# Patient Record
Sex: FEMALE | Race: WHITE | NOT HISPANIC OR LATINO | Employment: UNEMPLOYED | URBAN - METROPOLITAN AREA
[De-identification: names, ages, dates, MRNs, and addresses within clinical notes are randomized per-mention and may not be internally consistent; named-entity substitution may affect disease eponyms.]

---

## 2017-01-28 ENCOUNTER — APPOINTMENT (OUTPATIENT)
Dept: CT IMAGING | Age: 37
End: 2017-01-28
Attending: EMERGENCY MEDICINE

## 2017-01-28 ENCOUNTER — HOSPITAL ENCOUNTER (EMERGENCY)
Age: 37
Discharge: HOME | End: 2017-01-28
Attending: EMERGENCY MEDICINE

## 2017-01-28 VITALS
BODY MASS INDEX: 45.99 KG/M2 | RESPIRATION RATE: 18 BRPM | TEMPERATURE: 98.7 F | WEIGHT: 293 LBS | OXYGEN SATURATION: 100 % | DIASTOLIC BLOOD PRESSURE: 100 MMHG | SYSTOLIC BLOOD PRESSURE: 151 MMHG | HEART RATE: 67 BPM | HEIGHT: 67 IN

## 2017-01-28 VITALS
HEART RATE: 96 BPM | SYSTOLIC BLOOD PRESSURE: 116 MMHG | HEIGHT: 67 IN | RESPIRATION RATE: 18 BRPM | BODY MASS INDEX: 45.99 KG/M2 | WEIGHT: 293 LBS | TEMPERATURE: 97.4 F | DIASTOLIC BLOOD PRESSURE: 75 MMHG | OXYGEN SATURATION: 96 %

## 2017-01-28 DIAGNOSIS — N20.0 BILATERAL KIDNEY STONES: ICD-10-CM

## 2017-01-28 DIAGNOSIS — R10.9 LEFT FLANK PAIN: Primary | ICD-10-CM

## 2017-01-28 DIAGNOSIS — R55 SYNCOPE, UNSPECIFIED SYNCOPE TYPE: ICD-10-CM

## 2017-01-28 DIAGNOSIS — S09.90XA CLOSED HEAD INJURY, INITIAL ENCOUNTER: Primary | ICD-10-CM

## 2017-01-28 LAB
ANION GAP SERPL CALC-SCNC: 16 MMOL/L (ref 10–20)
APPEARANCE UR: ABNORMAL
B-HCG UR QL: NEGATIVE
BACTERIA #/AREA URNS HPF: ABNORMAL /HPF
BASOPHILS # BLD AUTO: 0.1 K/MCL (ref 0–0.3)
BASOPHILS NFR BLD AUTO: 0 %
BILIRUB UR QL STRIP: NEGATIVE
BUN SERPL-MCNC: 5 MG/DL (ref 10–20)
BUN/CREAT SERPL: 11 (ref 7–25)
CALCIUM SERPL-MCNC: 9.3 MG/DL (ref 8.4–10.2)
CHLORIDE SERPL-SCNC: 104 MMOL/L (ref 98–107)
CO2 SERPL-SCNC: 23 MMOL/L (ref 21–32)
COLOR UR: ABNORMAL
CREAT SERPL-MCNC: 0.47 MG/DL (ref 0.51–0.95)
CROSSMATCH EXPIRE: NORMAL
DIFFERENTIAL METHOD BLD: ABNORMAL
EOSINOPHIL # BLD AUTO: 0.2 K/MCL (ref 0.1–0.5)
EOSINOPHIL NFR SPEC: 1 %
ERYTHROCYTE [DISTWIDTH] IN BLOOD: 13.9 % (ref 11–15)
GLUCOSE SERPL-MCNC: 125 MG/DL (ref 65–99)
GLUCOSE UR STRIP-MCNC: NEGATIVE MG/DL
HCT VFR BLD CALC: 45.8 % (ref 36–46.5)
HGB BLD-MCNC: 15.6 G/DL (ref 12–15.5)
HGB UR QL STRIP: NEGATIVE
HYALINE CASTS #/AREA URNS LPF: ABNORMAL /LPF (ref 0–5)
KETONES UR STRIP-MCNC: NEGATIVE MG/DL
LEUKOCYTE ESTERASE UR QL STRIP: NEGATIVE
LYMPHOCYTES # BLD MANUAL: 3.1 K/MCL (ref 1–4.8)
LYMPHOCYTES NFR BLD MANUAL: 19 %
MCH RBC QN AUTO: 30.9 PG (ref 26–34)
MCHC RBC AUTO-ENTMCNC: 34.1 G/DL (ref 32–36.5)
MCV RBC AUTO: 90.7 FL (ref 78–100)
MONOCYTES # BLD MANUAL: 0.6 K/MCL (ref 0.3–0.9)
MONOCYTES NFR BLD MANUAL: 4 %
MUCOUS THREADS URNS QL MICRO: PRESENT
NEUTROPHILS # BLD AUTO: 12.9 K/MCL (ref 1.8–7.7)
NEUTROPHILS NFR BLD AUTO: 76 %
NITRITE UR QL STRIP: NEGATIVE
PH UR STRIP: 5.5 UNITS (ref 5–7)
PLATELET # BLD: 211 K/MCL (ref 140–450)
POTASSIUM SERPL-SCNC: 2.9 MMOL/L (ref 3.4–5.1)
PROT UR STRIP-MCNC: NEGATIVE MG/DL
RBC # BLD: 5.05 MIL/MCL (ref 4–5.2)
RBC #/AREA URNS HPF: ABNORMAL /HPF (ref 0–3)
SODIUM SERPL-SCNC: 140 MMOL/L (ref 135–145)
SP GR UR STRIP: 1.02 (ref 1–1.03)
SPECIMEN SOURCE: ABNORMAL
SQUAMOUS #/AREA URNS HPF: ABNORMAL /HPF (ref 0–5)
UROBILINOGEN UR STRIP-MCNC: 1 MG/DL (ref 0–1)
WBC # BLD: 16.8 K/MCL (ref 4.2–11)
WBC #/AREA URNS HPF: ABNORMAL /HPF (ref 0–5)

## 2017-01-28 PROCEDURE — 80048 BASIC METABOLIC PNL TOTAL CA: CPT

## 2017-01-28 PROCEDURE — 70450 CT HEAD/BRAIN W/O DYE: CPT | Performed by: RADIOLOGY

## 2017-01-28 PROCEDURE — 70450 CT HEAD/BRAIN W/O DYE: CPT

## 2017-01-28 PROCEDURE — 99284 EMERGENCY DEPT VISIT MOD MDM: CPT

## 2017-01-28 PROCEDURE — 93005 ELECTROCARDIOGRAM TRACING: CPT | Performed by: EMERGENCY MEDICINE

## 2017-01-28 PROCEDURE — 36415 COLL VENOUS BLD VENIPUNCTURE: CPT

## 2017-01-28 PROCEDURE — 85025 COMPLETE CBC W/AUTO DIFF WBC: CPT

## 2017-01-28 PROCEDURE — 74176 CT ABD & PELVIS W/O CONTRAST: CPT | Performed by: RADIOLOGY

## 2017-01-28 RX ORDER — HYDROMORPHONE HYDROCHLORIDE 1 MG/ML
0.5 INJECTION, SOLUTION INTRAMUSCULAR; INTRAVENOUS; SUBCUTANEOUS
Status: DISCONTINUED | OUTPATIENT
Start: 2017-01-28 | End: 2017-01-28 | Stop reason: HOSPADM

## 2017-01-28 RX ORDER — SODIUM CHLORIDE 9 MG/ML
INJECTION, SOLUTION INTRAVENOUS CONTINUOUS
Status: DISCONTINUED | OUTPATIENT
Start: 2017-01-28 | End: 2017-01-28 | Stop reason: HOSPADM

## 2017-01-28 RX ORDER — ONDANSETRON 2 MG/ML
4 INJECTION INTRAMUSCULAR; INTRAVENOUS
Status: COMPLETED | OUTPATIENT
Start: 2017-01-28 | End: 2017-01-28

## 2017-01-28 RX ORDER — TAMSULOSIN HYDROCHLORIDE 0.4 MG/1
0.4 CAPSULE ORAL DAILY
Qty: 30 CAPSULE | Refills: 0 | Status: SHIPPED | OUTPATIENT
Start: 2017-01-28

## 2017-01-28 RX ORDER — HALOPERIDOL 5 MG/ML
2.5 INJECTION INTRAMUSCULAR ONCE
Status: COMPLETED | OUTPATIENT
Start: 2017-01-28 | End: 2017-01-28

## 2017-01-28 RX ORDER — ONDANSETRON 8 MG/1
8 TABLET, ORALLY DISINTEGRATING ORAL EVERY 8 HOURS PRN
Qty: 10 TABLET | Refills: 0 | Status: SHIPPED | OUTPATIENT
Start: 2017-01-28

## 2017-01-28 RX ORDER — HYDROCODONE BITARTRATE AND ACETAMINOPHEN 5; 325 MG/1; MG/1
1-2 TABLET ORAL EVERY 6 HOURS PRN
Qty: 20 TABLET | Refills: 0 | Status: SHIPPED | OUTPATIENT
Start: 2017-01-28

## 2017-01-28 RX ADMIN — HYDROMORPHONE HYDROCHLORIDE 0.5 MG: 1 INJECTION, SOLUTION INTRAMUSCULAR; INTRAVENOUS; SUBCUTANEOUS at 06:21

## 2017-01-28 RX ADMIN — ONDANSETRON 4 MG: 2 SOLUTION INTRAMUSCULAR; INTRAVENOUS at 04:59

## 2017-01-28 RX ADMIN — HALOPERIDOL LACTATE 2.5 MG: 5 INJECTION, SOLUTION INTRAMUSCULAR at 06:20

## 2017-01-28 RX ADMIN — MORPHINE SULFATE 4 MG: 4 INJECTION, SOLUTION INTRAMUSCULAR; INTRAVENOUS at 04:58

## 2017-01-28 RX ADMIN — SODIUM CHLORIDE 1000 ML: 9 INJECTION, SOLUTION INTRAVENOUS at 04:54

## 2017-01-28 ASSESSMENT — ENCOUNTER SYMPTOMS
SORE THROAT: 0
FEVER: 0
COUGH: 0
WEAKNESS: 0
NAUSEA: 0
RHINORRHEA: 0
VOMITING: 0
BRUISES/BLEEDS EASILY: 0
DIARRHEA: 0
SHORTNESS OF BREATH: 0
NAUSEA: 1
HEADACHES: 1
SORE THROAT: 0
SHORTNESS OF BREATH: 1
DIZZINESS: 0
ADENOPATHY: 0
DIZZINESS: 0
AGITATION: 0
ABDOMINAL PAIN: 0
CHILLS: 0
BACK PAIN: 1
PSYCHIATRIC NEGATIVE: 1
FEVER: 0
HEADACHES: 0
VOMITING: 0
COUGH: 0
DIARRHEA: 0
NUMBNESS: 0

## 2017-01-28 ASSESSMENT — PAIN SCALES - GENERAL
PAINLEVEL_OUTOF10: 6
PAIN_LEVEL_AT_REST: 8
PAIN_LEVEL_AT_REST: 8
PAINLEVEL_OUTOF10: 5
PAINLEVEL_OUTOF10: 4
PAINLEVEL_OUTOF10: 8

## 2017-01-29 LAB
ATRIAL RATE (BPM): 99
DIASTOLIC BLOOD PRESSURE: 114
P AXIS (DEGREES): 49
PR-INTERVAL (MSEC): 152
QRS-INTERVAL (MSEC): 92
QT-INTERVAL (MSEC): 400
QTC: 513
R AXIS (DEGREES): 62
REPORT TEXT: NORMAL
SYSTOLIC BLOOD PRESSURE: 175
T AXIS (DEGREES): 0
VENTRICULAR RATE EKG/MIN (BPM): 99

## 2018-07-17 ENCOUNTER — APPOINTMENT (OUTPATIENT)
Dept: CT IMAGING | Facility: CLINIC | Age: 38
End: 2018-07-17
Attending: EMERGENCY MEDICINE
Payer: MEDICARE

## 2018-07-17 ENCOUNTER — APPOINTMENT (OUTPATIENT)
Dept: MRI IMAGING | Facility: CLINIC | Age: 38
End: 2018-07-17
Attending: EMERGENCY MEDICINE
Payer: MEDICARE

## 2018-07-17 ENCOUNTER — APPOINTMENT (OUTPATIENT)
Dept: GENERAL RADIOLOGY | Facility: CLINIC | Age: 38
End: 2018-07-17
Attending: EMERGENCY MEDICINE
Payer: MEDICARE

## 2018-07-17 ENCOUNTER — HOSPITAL ENCOUNTER (EMERGENCY)
Facility: CLINIC | Age: 38
Discharge: HOME | End: 2018-07-17
Attending: EMERGENCY MEDICINE | Admitting: EMERGENCY MEDICINE
Payer: MEDICARE

## 2018-07-17 VITALS
TEMPERATURE: 98.8 F | WEIGHT: 260.14 LBS | OXYGEN SATURATION: 100 % | SYSTOLIC BLOOD PRESSURE: 158 MMHG | RESPIRATION RATE: 16 BRPM | HEIGHT: 67 IN | BODY MASS INDEX: 40.83 KG/M2 | HEART RATE: 62 BPM | DIASTOLIC BLOOD PRESSURE: 100 MMHG

## 2018-07-17 DIAGNOSIS — R07.9 CHEST PAIN, UNSPECIFIED TYPE: ICD-10-CM

## 2018-07-17 DIAGNOSIS — J18.9 PULMONARY INFECTION: ICD-10-CM

## 2018-07-17 DIAGNOSIS — F17.210 CIGARETTE SMOKER: ICD-10-CM

## 2018-07-17 LAB
ALBUMIN SERPL-MCNC: 3.8 G/DL (ref 3.4–5)
ALP SERPL-CCNC: 92 U/L (ref 40–150)
ALT SERPL W P-5'-P-CCNC: 21 U/L (ref 0–50)
ANION GAP SERPL CALCULATED.3IONS-SCNC: 7 MMOL/L (ref 3–14)
AST SERPL W P-5'-P-CCNC: 8 U/L (ref 0–45)
BASOPHILS # BLD AUTO: 0.1 10E9/L (ref 0–0.2)
BASOPHILS NFR BLD AUTO: 0.5 %
BILIRUB SERPL-MCNC: 0.5 MG/DL (ref 0.2–1.3)
BUN SERPL-MCNC: 8 MG/DL (ref 7–30)
CALCIUM SERPL-MCNC: 8.8 MG/DL (ref 8.5–10.1)
CHLORIDE SERPL-SCNC: 105 MMOL/L (ref 94–109)
CO2 SERPL-SCNC: 28 MMOL/L (ref 20–32)
CREAT SERPL-MCNC: 0.57 MG/DL (ref 0.52–1.04)
D DIMER PPP FEU-MCNC: 0.4 UG/ML FEU (ref 0–0.5)
DIFFERENTIAL METHOD BLD: ABNORMAL
EOSINOPHIL # BLD AUTO: 0.4 10E9/L (ref 0–0.7)
EOSINOPHIL NFR BLD AUTO: 3.2 %
ERYTHROCYTE [DISTWIDTH] IN BLOOD BY AUTOMATED COUNT: 12.7 % (ref 10–15)
GFR SERPL CREATININE-BSD FRML MDRD: >90 ML/MIN/1.7M2
GLUCOSE SERPL-MCNC: 88 MG/DL (ref 70–99)
HCG SERPL QL: NEGATIVE
HCT VFR BLD AUTO: 45.6 % (ref 35–47)
HGB BLD-MCNC: 15.4 G/DL (ref 11.7–15.7)
IMM GRANULOCYTES # BLD: 0 10E9/L (ref 0–0.4)
IMM GRANULOCYTES NFR BLD: 0.3 %
INR PPP: 0.94 (ref 0.86–1.14)
INTERPRETATION ECG - MUSE: NORMAL
LYMPHOCYTES # BLD AUTO: 2.5 10E9/L (ref 0.8–5.3)
LYMPHOCYTES NFR BLD AUTO: 21.9 %
MCH RBC QN AUTO: 30.6 PG (ref 26.5–33)
MCHC RBC AUTO-ENTMCNC: 33.8 G/DL (ref 31.5–36.5)
MCV RBC AUTO: 91 FL (ref 78–100)
MONOCYTES # BLD AUTO: 0.6 10E9/L (ref 0–1.3)
MONOCYTES NFR BLD AUTO: 5.6 %
NEUTROPHILS # BLD AUTO: 7.8 10E9/L (ref 1.6–8.3)
NEUTROPHILS NFR BLD AUTO: 68.5 %
NRBC # BLD AUTO: 0 10*3/UL
NRBC BLD AUTO-RTO: 0 /100
NT-PROBNP SERPL-MCNC: 17 PG/ML (ref 0–450)
PLATELET # BLD AUTO: 195 10E9/L (ref 150–450)
POTASSIUM SERPL-SCNC: 3.6 MMOL/L (ref 3.4–5.3)
PROT SERPL-MCNC: 7.6 G/DL (ref 6.8–8.8)
RBC # BLD AUTO: 5.03 10E12/L (ref 3.8–5.2)
SODIUM SERPL-SCNC: 139 MMOL/L (ref 133–144)
TROPONIN I SERPL-MCNC: <0.015 UG/L (ref 0–0.04)
TROPONIN I SERPL-MCNC: <0.015 UG/L (ref 0–0.04)
WBC # BLD AUTO: 11.4 10E9/L (ref 4–11)

## 2018-07-17 PROCEDURE — 93005 ELECTROCARDIOGRAM TRACING: CPT | Mod: 76

## 2018-07-17 PROCEDURE — 93010 ELECTROCARDIOGRAM REPORT: CPT | Mod: Z6 | Performed by: EMERGENCY MEDICINE

## 2018-07-17 PROCEDURE — 84703 CHORIONIC GONADOTROPIN ASSAY: CPT | Performed by: EMERGENCY MEDICINE

## 2018-07-17 PROCEDURE — 70549 MR ANGIOGRAPH NECK W/O&W/DYE: CPT

## 2018-07-17 PROCEDURE — 25000128 H RX IP 250 OP 636: Performed by: RADIOLOGY

## 2018-07-17 PROCEDURE — 99285 EMERGENCY DEPT VISIT HI MDM: CPT | Mod: 25 | Performed by: EMERGENCY MEDICINE

## 2018-07-17 PROCEDURE — 80053 COMPREHEN METABOLIC PANEL: CPT | Performed by: EMERGENCY MEDICINE

## 2018-07-17 PROCEDURE — 85025 COMPLETE CBC W/AUTO DIFF WBC: CPT | Performed by: EMERGENCY MEDICINE

## 2018-07-17 PROCEDURE — 85379 FIBRIN DEGRADATION QUANT: CPT | Performed by: EMERGENCY MEDICINE

## 2018-07-17 PROCEDURE — 71260 CT THORAX DX C+: CPT

## 2018-07-17 PROCEDURE — A9270 NON-COVERED ITEM OR SERVICE: HCPCS | Mod: GY | Performed by: EMERGENCY MEDICINE

## 2018-07-17 PROCEDURE — 83880 ASSAY OF NATRIURETIC PEPTIDE: CPT | Performed by: EMERGENCY MEDICINE

## 2018-07-17 PROCEDURE — 84484 ASSAY OF TROPONIN QUANT: CPT | Performed by: EMERGENCY MEDICINE

## 2018-07-17 PROCEDURE — 99285 EMERGENCY DEPT VISIT HI MDM: CPT | Mod: 25

## 2018-07-17 PROCEDURE — 85610 PROTHROMBIN TIME: CPT | Performed by: EMERGENCY MEDICINE

## 2018-07-17 PROCEDURE — A9585 GADOBUTROL INJECTION: HCPCS | Performed by: EMERGENCY MEDICINE

## 2018-07-17 PROCEDURE — 71046 X-RAY EXAM CHEST 2 VIEWS: CPT

## 2018-07-17 PROCEDURE — 25000128 H RX IP 250 OP 636: Performed by: EMERGENCY MEDICINE

## 2018-07-17 PROCEDURE — 93005 ELECTROCARDIOGRAM TRACING: CPT

## 2018-07-17 PROCEDURE — 25000132 ZZH RX MED GY IP 250 OP 250 PS 637: Performed by: EMERGENCY MEDICINE

## 2018-07-17 RX ORDER — DOXYCYCLINE 100 MG/1
100 CAPSULE ORAL 2 TIMES DAILY
Qty: 20 CAPSULE | Refills: 0 | Status: SHIPPED | OUTPATIENT
Start: 2018-07-17 | End: 2018-07-27

## 2018-07-17 RX ORDER — ACETAMINOPHEN 500 MG
1000 TABLET ORAL ONCE
Status: COMPLETED | OUTPATIENT
Start: 2018-07-17 | End: 2018-07-17

## 2018-07-17 RX ORDER — IOPAMIDOL 755 MG/ML
74 INJECTION, SOLUTION INTRAVASCULAR ONCE
Status: COMPLETED | OUTPATIENT
Start: 2018-07-17 | End: 2018-07-17

## 2018-07-17 RX ORDER — GADOBUTROL 604.72 MG/ML
10 INJECTION INTRAVENOUS ONCE
Status: COMPLETED | OUTPATIENT
Start: 2018-07-17 | End: 2018-07-17

## 2018-07-17 RX ORDER — LORAZEPAM 0.5 MG/1
1 TABLET ORAL ONCE
Status: COMPLETED | OUTPATIENT
Start: 2018-07-17 | End: 2018-07-17

## 2018-07-17 RX ORDER — ASPIRIN 81 MG/1
324 TABLET, CHEWABLE ORAL ONCE
Status: COMPLETED | OUTPATIENT
Start: 2018-07-17 | End: 2018-07-17

## 2018-07-17 RX ADMIN — GADOBUTROL 10 ML: 604.72 INJECTION INTRAVENOUS at 15:12

## 2018-07-17 RX ADMIN — IOPAMIDOL 74 ML: 755 INJECTION, SOLUTION INTRAVENOUS at 17:27

## 2018-07-17 RX ADMIN — ACETAMINOPHEN 1000 MG: 500 TABLET, FILM COATED ORAL at 16:43

## 2018-07-17 RX ADMIN — ASPIRIN 81 MG 324 MG: 81 TABLET ORAL at 12:49

## 2018-07-17 RX ADMIN — LORAZEPAM 1 MG: 0.5 TABLET ORAL at 14:02

## 2018-07-17 ASSESSMENT — ENCOUNTER SYMPTOMS
DIZZINESS: 1
LIGHT-HEADEDNESS: 1
NUMBNESS: 0
ABDOMINAL PAIN: 0
HEADACHES: 1
SHORTNESS OF BREATH: 1

## 2018-07-17 NOTE — ED AVS SNAPSHOT
[facility], Emergency Department    [address] [facility] [address]    Phone:  [phone]                                       Johana Rodriguez   MRN: [id number]    Department:  [facility], Emergency Department   Date of Visit:  7/17/2018           Patient Information     Date Of Birth          1980        Your diagnoses for this visit were:     Chest pain, unspecified type     Pulmonary infection        You were seen by Fortino Tran MD and Morelia Bustillo MD.        Discharge Instructions       TODAY'S VISIT:  You were seen today for for being off balance, chest pain and shortness of breath    FOLLOW-UP:  Please make an appointment to follow up with:  - Your Primary Care Provider in 1-2 days for ED follow up and additional heart evaluation and blood pressure control.    PRESCRIPTIONS / MEDICATIONS:  - Take the prescribed antibiotic until completed or told otherwise by a medical provider.     OTHER INSTRUCTIONS:  - Do your best femi stay hydrated.     RETURN TO THE EMERGENCY DEPARTMENT  Return to the Emergency Department at any time for new/worsening symptoms.       Near-Fainting with Uncertain Cause  Fainting (syncope) is a temporary loss of consciousness (passing out). This happens when blood flow to the brain is reduced. Near-fainting (near-syncope) is like fainting, but you do not fully pass out. Instead, you feel like you are going to pass out, but do not actually lose consciousness.  Signs and symptoms  The following are symptoms of near-fainting:    Feeling lightheaded or like you are going to faint    Weak pulse    Nausea    Sweating    Blurred vision or feeling like your vision is fading    Palpitations    Chest pain    Hard time breathing    Feeling cool and clammy  Causes  This happens when your blood pressure suddenly drops, and not enough blood flows to your brain.  Common minor causes include:    Sudden emotional stress such as fear, pain, panic, or the sight of  blood    Straining or overexertion, such as straining while using the toilet, coughing, or sneezing    Standing up too quickly, or standing up for too long a time  More serious causes include:    Very slow, fast, or irregular heart rate (arrhythmia)    Dehydration    Significant blood loss    Medicines, or a recent change in medicines. Medicines that can cause fainting include blood pressure or heart medicines.    Heart attack    Heart valve problems  Remember, even minor causes can become serious if you fall and injure yourself, or are driving. You may need more tests. It is very important that you follow up with your doctor as advised.  Home care  The following guidelines will help you care for yourself at home:    Rest today. Resume your normal activities as soon as you are feeling back to normal.    If you become lightheaded or dizzy, lie down right away or sit with your head between your knees.    Drink plenty of fluids and don't skip meals.  Because the exact cause of your near fainting spell is not known, another spell could occur without warning. To stay safe, do not drive a car or use dangerous equipment. Do not take a bath alone. Use a shower instead. Do not swim alone. You can resume these activities when your healthcare provider says that you are no longer in danger of having a near-fainting spell.  Follow-up care  Follow up with your healthcare provider, or as advised.  Call 911  Call 911 if any of the following occur:    Another near-fainting or full fainting spell occurs, and it is not explained by the common causes listed above    Chest, arm, neck, jaw, back or abdominal pain    Shortness of breath    Weakness, tingling, or numbness in one side of the face, or in one arm or leg    Slurred speech, confusion, trouble walking or seeing    Seizure  When to seek medical advice  Call your healthcare provider right away if any of these occur:    Changes in your medicines    Occasional mild lightheadedness,  especially when standing up too quickly or straining  Date Last Reviewed: 10/1/2016    3084-7949 [employer]. [address] [place] [address], [place] [address]. All rights reserved. This information is not intended as a substitute for professional medical care. Always follow your healthcare professional's instructions.        Pneumonia (Adult)  Pneumonia is an infection deep within the lungs. It is in the small air sacs (alveoli). Pneumonia may be caused by a virus or bacteria. Pneumonia caused by bacteria is usually treated with an antibiotic. Severe cases may need to be treated in the hospital. Milder cases can be treated at home. Symptoms usually start to get better during the first 2 days of treatment.    Home care  Follow these guidelines when caring for yourself at home:    Rest at home for the first 2 to 3 days, or until you feel stronger. Don t let yourself get overly tired when you go back to your activities.    Stay away from cigarette smoke - yours or other people s.    You may use acetaminophen or ibuprofen to control fever or pain, unless another medicine was prescribed. If you have chronic liver or kidney disease, talk with your healthcare provider before using these medicines. Also talk with your provider if you ve had a stomach ulcer or gastrointestinal bleeding. Don t give aspirin to anyone younger than 18 years of age who is ill with a fever. It may cause severe liver damage.    Your appetite may be poor, so a light diet is fine.    Drink 6 to 8 glasses of fluids every day to make sure you are getting enough fluids. Beverages can include water, sport drinks, sodas without caffeine, juices, tea, or soup. Fluids will help loosen secretions in the lung. This will make it easier for you to cough up the phlegm (sputum). If you also have heart or kidney disease, check with your healthcare provider before you drink extra fluids.    Take antibiotic medicine prescribed until it is all gone, even if  you are feeling better after a few days.  Follow-up care  Follow up with your healthcare provider in the next 2 to 3 days, or as advised. This is to be sure the medicine is helping you get better.  If you are 65 or older, you should get a pneumococcal vaccine and a yearly flu (influenza) shot. You should also get these vaccines if you have chronic lung disease like asthma, emphysema, or COPD. Recently, a second type of pneumonia vaccine has become available for everyone over 65 years old. This is in addition to the previous vaccine. Ask your provider about this.  When to seek medical advice  Call your healthcare provider right away if any of these occur:    You don t get better within the first 48 hours of treatment    Shortness of breath gets worse    Rapid breathing (more than 25 breaths per minute)    Coughing up blood    Chest pain gets worse with breathing    Fever of 100.4 F (38 C) or higher that doesn t get better with fever medicine    Weakness, dizziness, or fainting that gets worse    Thirst or dry mouth that gets worse    Sinus pain, headache, or a stiff neck    Chest pain not caused by coughing  Date Last Reviewed: 1/1/2017 2000-2017 [employer]. [address] [place] [address]. All rights reserved. This information is not intended as a substitute for professional medical care. Always follow your healthcare professional's instructions.         *CHEST PAIN, UNCERTAIN CAUSE    Based on your exam today, the exact cause of your chest pain is not certain. Your condition does not seem serious at this time, and your pain does not appear to be coming from your heart. However, sometimes the signs of a serious problem take more time to appear. Therefore, watch for the warning signs listed below.  HOME CARE:    1. Rest today and avoid strenuous activity.  2. Take any prescribed medicine as directed.  FOLLOW UP with your doctor in 1-3 days.   GET PROMPT MEDICAL ATTENTION if any of the  following occur:    A change in the type of pain: if it feels different, becomes more severe, lasts longer, or begins to spread into your shoulder, arm, neck, jaw or back    Shortness of breath or increased pain with breathing    Weakness, dizziness, or fainting    Cough with blood or dark colored sputum (phlegm)    Fever over 101  F (38.3  C)    Swelling, pain or redness in one leg    7594-6118 [employer]. [address] [place] [address]. All rights reserved. This information is not intended as a substitute for professional medical care. Always follow your healthcare professional's instructions.  This information has been modified by your health care provider with permission from the publisher.        24 Hour Appointment Hotline       To make an appointment at any [facility], call [phone] ([phone]). If you don't have a family doctor or clinic, we will help you find one. [facility] clinics are conveniently located to serve the needs of you and your family.             Review of your medicines      START taking        Dose / Directions Last dose taken    doxycycline 100 MG capsule   Commonly known as:  VIBRAMYCIN   Dose:  100 mg   Quantity:  20 capsule        Take 1 capsule (100 mg) by mouth 2 times daily for 10 days   Refills:  0                Prescriptions were sent or printed at these locations (1 Prescription)                   Other Prescriptions                Printed at Department/Unit printer (1 of 1)         doxycycline (VIBRAMYCIN) 100 MG capsule                Procedures and tests performed during your visit     Procedure/Test Number of Times Performed    CBC with platelets differential 1    CT Chest Pulmonary Embolism w Contrast 1    Comprehensive metabolic panel 1    D dimer quantitative 1    EKG 12-lead, tracing only 2    HCG qualitative 1    INR 1    MR Brain for Stroke Complete (UU,UA,SH) 1    Nt probnp inpatient 1    Troponin I 2    XR Chest 2 Views 1     "  Orders Needing Specimen Collection     Ordered          07/17/18 1210  UA with Microscopic - STAT, [facility]: STAT, Needs to be Collected     Scheduled Task Status   07/17/18 1211 Collect UA with Microscopic Open   Order Class:  PCU Collect                07/17/18 1210  HCG qualitative urine - STAT, [facility]: STAT, Needs to be Collected     Scheduled Task Status   07/17/18 1211 Collect HCG qualitative urine Open   Order Class:  PCU Collect                  Pending Results     No orders found from 7/15/2018 to 7/18/2018.            Pending Culture Results     No orders found from 7/15/2018 to 7/18/2018.            Pending Results Instructions     If you had any lab results that were not finalized at the time of your Discharge, you can call the ED Lab Result RN at [phone]. You will be contacted by this team for any positive Lab results or changes in treatment. The nurses are available 7 days a week from 10A to 6:30P.  You can leave a message 24 hours per day and they will return your call.        Thank you for choosing [facility]       Thank you for choosing [facility] for your care. Our goal is always to provide you with excellent care. Hearing back from our patients is one way we can continue to improve our services. Please take a few minutes to complete the written survey that you may receive in the mail after you visit with us. Thank you!        [email or web address] Information     [employer] lets you send messages to your doctor, view your test results, renew your prescriptions, schedule appointments and more. To sign up, go to [email or web address] . Click on \"Log in\" on the left side of the screen, which will take you to the Welcome page. Then click on \"Sign up Now\" on the right side of the page.     You will be asked to enter the access code listed below, as well as some personal information. Please follow the directions to create your username and password.     Your access code is: [email or web address]  Expires: 10/15/2018  7:17 PM   "   Your access code will  in 90 days. If you need help or a new code, please call your [facility] clinic or [phone].        Care EveryWhere ID     This is your Care EveryWhere ID. This could be used by other organizations to access your [facility] medical records  [id number]        Equal Access to Services     RODRICK FERNANDO : Angel felixo Somica, waaxda luqadaha, qaybta kaalmada raul, beto chapman. So [facility] [phone].    ATENCIÓN: Si habla español, tiene a casanova disposición servicios gratuitos de asistencia lingüística. Llame al [phone].    We comply with applicable federal civil rights laws and [place] laws. We do not discriminate on the basis of race, color, national origin, age, disability, sex, sexual orientation, or gender identity.            After Visit Summary       This is your record. Keep this with you and show to your community pharmacist(s) and doctor(s) at your next visit.

## 2018-07-17 NOTE — ED PROVIDER NOTES
SIGN-OUT:  - Assumed care of this patient from Dr. Tran  - Pending at shift change: CT PE Scan  - Plan from initial EM Provider: F/U pending CT scan. If CT PE negative, they think patient can be D/C'd for further outpatient workup and close follow-up.    REASSESSMENT:  - No acute issues or interventions necessary while still in the emergency department.  - CT PE: 1. No pulmonary embolism identified. 2. Mild tree-in-bud nodularity in the anterior right upper and right middle lobes, which may represent an infectious/inflammatory process.     DISPOSITION:  - Patient carried on with their original disposition plan from original EM MD.

## 2018-07-17 NOTE — ED AVS SNAPSHOT
[facility], [place], Emergency Department    [address] [place] [address]    Phone:  [phone]                                       Johana Rodriguez   MRN: [id number]    Department:  [facility], Emergency Department   Date of Visit:  7/17/2018           After Visit Summary Signature Page     I have received my discharge instructions, and my questions have been answered. I have discussed any challenges I see with this plan with the nurse or doctor.    ..........................................................................................................................................  Patient/Patient Representative Signature      ..........................................................................................................................................  Patient Representative Print Name and Relationship to Patient    ..................................................               ................................................  Date                                            Time    ..........................................................................................................................................  Reviewed by Signature/Title    ...................................................              ..............................................  Date                                                            Time

## 2018-07-17 NOTE — ED PROVIDER NOTES
"  History     Chief Complaint   Patient presents with     Chest Pain     Shortness of Breath     HPI  Johana Rodriguez is a 38 year old female with personal history of anxiety, HTN, cerebral infarction, DVT, and lacunar strokes (on Xarelto, though has not taken it for 2 weeks) who presents for evaluation of 2 days of generalized malaise, chest pain, and SOB. She describes her chest pain as central and left-sided, tight and sharp that worsens with deep breaths. Additionally, patient reports a headache, dizziness, lightheadedness with near-syncope, and difficulty keeping balance since last night. She states she came to [place] yesterday as her boyfriend had a dental appointment. As she was walking around [facility] last night, her boyfriend noticed she had difficulty walking, running into walls, almost falling over. She states since then, she has had a few near-syncopal episodes that alleviates if she sits, no LOC. She endorses a recent 4 hour car ride, no other periods of prolonged immobilization. No new lower extremity symptoms. Patient describes her past lacunar strokes as the onset of numbness and tingling in her left-side, symptoms she currently denies, and a headache. She denies any other neurologic complaints at this time aside from feeling off balance.  Denies abdominal pain. No estrogen use or other hormonal medication use. Patient is a smoker and usually receives her medical care from [facility]. CP is \"sharp\", does not radiate. Not exertional.     Denies personal cardiac history. Family history of MI mother, grandmother and grandfather in their 50's.     Past Medical History:   Diagnosis Date     Anxiety      Cerebral infarction (H)      Hypertension        History reviewed. No pertinent surgical history.    No family history on file.    Social History   Substance Use Topics     Smoking status: Current Every Day Smoker     Packs/day: 1.00     Smokeless tobacco: Never Used     Alcohol use No       No current " "facility-administered medications for this encounter.      Current Outpatient Prescriptions   Medication     doxycycline (VIBRAMYCIN) 100 MG capsule        Allergies   Allergen Reactions     Ciprofloxacin Hives     Imitrex [Sumatriptan] Other (See Comments)     dystonia     Losartan Hives and Cough       I have reviewed the Medications, Allergies, Past Medical and Surgical History, and Social History in the Epic system.    Review of Systems   Respiratory: Positive for shortness of breath.    Cardiovascular: Positive for chest pain (central, left-sided).   Gastrointestinal: Negative for abdominal pain.   Neurological: Positive for dizziness, syncope (near-syncope), light-headedness and headaches. Negative for numbness.       Physical Exam   BP: (!) 181/112  Pulse: 79  Heart Rate: 79  Temp: 98.5  F (36.9  C)  Resp: 18  Height: 170.2 cm (5' 7\")  Weight: 118 kg (260 lb 2.3 oz)  SpO2: 98 %      Physical Exam  General: awake, alert, NAD  Head: normal cephalic  HEENT: pupils equal, conjugate gaze in tact, EOMI, fatigable nystagmus horizontally, bilaterally.   Neck: Supple  CV: regular rate and rhythm without murmur  Lungs: clear to ascultation  Abd: soft, non-tender, no guarding, no peritoneal signs  EXT: lower extremities without swelling or edema  Neuro: awake, answers questions appropriately. 5/5 strength in bilateral upper and lower extremities, normal heel to shin testing, normal finger to nose testing.  Normal speech.  Normal thought processes. CN II-xII intact       ED Course     ED Course     Procedures       11:42 AM  The patient was seen and examined by Dr. Tran in Room 11.          EKG Interpretation:      Interpreted by Dr. Tran  Time reviewed: 11:35 AM  Symptoms at time of EKG: chest pain  Rhythm: normal sinus   Rate: Normal  Axis: Normal  Ectopy: none  Conduction: normal  ST Segments/ T Waves: Prolonged QT  Q Waves: none  Comparison to prior: No old EKG available    Clinical Impression: no signs of acute " "ischemia.     EKG #2:     Normal sinus rhythm, normal rate, normal axis, no ectopy.  No ST-T wave changes.  Unchanged from previous.                     Labs Ordered and Resulted from Time of ED Arrival Up to the Time of Departure from the ED   CBC WITH PLATELETS DIFFERENTIAL - Abnormal; Notable for the following:        Result Value    WBC 11.4 (*)     All other components within normal limits   COMPREHENSIVE METABOLIC PANEL   TROPONIN I   D DIMER QUANTITATIVE   INR   NT PROBNP INPATIENT   TROPONIN I   HCG QUALITATIVE   ROUTINE UA WITH MICROSCOPIC   HCG QUALITATIVE URINE            Assessments & Plan (with Medical Decision Making)   Johana Rodriguez is a 38 year old female with a complicated past medical hx, including hx of hypercoagulable state, with DVT, not currently on xerelto due to medication noncompliance, who presents with chest pain, shortness of breath. Well appearing, nontoxic, hypertensive but otherwise normal VS. Patient also addresses her chest pain as pleuritic and its been constant for last 24 hrs. EKG was obtained and did not show any signs of acute ischemia, no previous to compare it to. She was given 325 of aspirin in case this represents ACS. Reassuring that her first troponin is negative despite 24 hours of symptoms Differential for chest pain with also include things like PE, dissection, CHF or other hypertensive emergency as she is hypertensive here. She reports this is actually much lower than her baseline hypertension. Of note patient also complains of \"drifting towards the left. Feeling drunk\". No obvious abnormalities on neuro exam, again this has been 24 hrs worth of symptoms. I did contact the stroke neurology team and discussed the case with them, they recommended MRI and MRA for further assesment of this. Did not think stroke code was warranted.     Initial evaluation was notable for EKG without signs of acute ischemia,, negative triponin, normal CBC (mild WBC elevation, otherwise " unremarkable) and electrolytes, and chest xray without significant abnormailities and negative D-dimer, negative BMP. MRI/MRA with no evidence of acute infarction or inter-cranial hemorrhage, no abnormal lesions, no aneurysms, or stenosis. Stroke had recommended if her MR was normal no further evaluation necessary here in the ER.     At this point patient has hx of DVT and has been off her anticoagulation, despite negative D-dimer and normal vital signs I do feel that a PE study is still warranted, so at this point we will add a PE onto her evaluation. Patient has been in the ER for 6 hrs at this point we will also do a delta troponin and delta EKG.     Repeat trop was negative, repeat EKG was unchanged. Patient has a heart score of 2 (based on risk factors) which makes her low risk, plan will be to discharge from the ER after extensive evaluation of stroke and other acute life-threatening pathologies such as ACS or PE. I would encourage that she follow up with her PCP for further cardiac restratification given her complaint of chest pain and return with new or worsening symptoms.     I have reviewed the nursing notes.    I have reviewed the findings, diagnosis, plan and need for follow up with the patient.    Discharge Medication List as of 7/17/2018  7:17 PM      START taking these medications    Details   doxycycline (VIBRAMYCIN) 100 MG capsule Take 1 capsule (100 mg) by mouth 2 times daily for 10 days, Disp-20 capsule, R-0, Local Print             Final diagnoses:   Chest pain, unspecified type   Pulmonary infection   ILuis Fernando, am serving as a trained medical scribe to document services personally performed by Fortino Tran MD, based on the provider's statements to me.   IFortino MD, was physically present and have reviewed and verified the accuracy of this note documented by Luis Fernando Kenyon.      7/17/2018   [facility], EMERGENCY DEPARTMENT     Fortino Tran MD  07/17/18 2024

## 2018-07-17 NOTE — ED TRIAGE NOTES
"ED TRIAGE    Medical / Trauma C/o:  38-yr female patient - presenting to ED with two days of progressing, worsening chest pain with SOB / SOA.  HX:  HTN, anxiety, previous CVA's (4 and 6 years ago), DVT.    Duration of C/o:  Two days     Contributing Factors / Concerning HX:  See HX    Significant Med's / Tx's:  See  med's    Febrile / Afebrile:  Afebrile    Patient Vitals for the past 24 hrs:   BP Temp Temp src Pulse Heart Rate Resp SpO2 Height Weight   07/17/18 1117 (!) 181/112 98.5  F (36.9  C) Oral 79 79 18 98 % 1.702 m (5' 7\") 118 kg (260 lb 2.3 oz)       Fortino Little  July 17, 2018  11:24 AM    "

## 2018-07-18 LAB — INTERPRETATION ECG - MUSE: NORMAL

## 2018-07-18 NOTE — DISCHARGE INSTRUCTIONS
TODAY'S VISIT:  You were seen today for for being off balance, chest pain and shortness of breath    FOLLOW-UP:  Please make an appointment to follow up with:  - Your Primary Care Provider in 1-2 days for ED follow up and additional heart evaluation and blood pressure control.    PRESCRIPTIONS / MEDICATIONS:  - Take the prescribed antibiotic until completed or told otherwise by a medical provider.     OTHER INSTRUCTIONS:  - Do your best femi stay hydrated.     RETURN TO THE EMERGENCY DEPARTMENT  Return to the Emergency Department at any time for new/worsening symptoms.       Near-Fainting with Uncertain Cause  Fainting (syncope) is a temporary loss of consciousness (passing out). This happens when blood flow to the brain is reduced. Near-fainting (near-syncope) is like fainting, but you do not fully pass out. Instead, you feel like you are going to pass out, but do not actually lose consciousness.  Signs and symptoms  The following are symptoms of near-fainting:    Feeling lightheaded or like you are going to faint    Weak pulse    Nausea    Sweating    Blurred vision or feeling like your vision is fading    Palpitations    Chest pain    Hard time breathing    Feeling cool and clammy  Causes  This happens when your blood pressure suddenly drops, and not enough blood flows to your brain.  Common minor causes include:    Sudden emotional stress such as fear, pain, panic, or the sight of blood    Straining or overexertion, such as straining while using the toilet, coughing, or sneezing    Standing up too quickly, or standing up for too long a time  More serious causes include:    Very slow, fast, or irregular heart rate (arrhythmia)    Dehydration    Significant blood loss    Medicines, or a recent change in medicines. Medicines that can cause fainting include blood pressure or heart medicines.    Heart attack    Heart valve problems  Remember, even minor causes can become serious if you fall and injure yourself, or are  driving. You may need more tests. It is very important that you follow up with your doctor as advised.  Home care  The following guidelines will help you care for yourself at home:    Rest today. Resume your normal activities as soon as you are feeling back to normal.    If you become lightheaded or dizzy, lie down right away or sit with your head between your knees.    Drink plenty of fluids and don't skip meals.  Because the exact cause of your near fainting spell is not known, another spell could occur without warning. To stay safe, do not drive a car or use dangerous equipment. Do not take a bath alone. Use a shower instead. Do not swim alone. You can resume these activities when your healthcare provider says that you are no longer in danger of having a near-fainting spell.  Follow-up care  Follow up with your healthcare provider, or as advised.  Call 911  Call 911 if any of the following occur:    Another near-fainting or full fainting spell occurs, and it is not explained by the common causes listed above    Chest, arm, neck, jaw, back or abdominal pain    Shortness of breath    Weakness, tingling, or numbness in one side of the face, or in one arm or leg    Slurred speech, confusion, trouble walking or seeing    Seizure  When to seek medical advice  Call your healthcare provider right away if any of these occur:    Changes in your medicines    Occasional mild lightheadedness, especially when standing up too quickly or straining  Date Last Reviewed: 10/1/2016    7228-8590 [employer]. [address] [place] [address], [place] [address]. All rights reserved. This information is not intended as a substitute for professional medical care. Always follow your healthcare professional's instructions.        Pneumonia (Adult)  Pneumonia is an infection deep within the lungs. It is in the small air sacs (alveoli). Pneumonia may be caused by a virus or bacteria. Pneumonia caused by bacteria is usually treated with  an antibiotic. Severe cases may need to be treated in the hospital. Milder cases can be treated at home. Symptoms usually start to get better during the first 2 days of treatment.    Home care  Follow these guidelines when caring for yourself at home:    Rest at home for the first 2 to 3 days, or until you feel stronger. Don t let yourself get overly tired when you go back to your activities.    Stay away from cigarette smoke - yours or other people s.    You may use acetaminophen or ibuprofen to control fever or pain, unless another medicine was prescribed. If you have chronic liver or kidney disease, talk with your healthcare provider before using these medicines. Also talk with your provider if you ve had a stomach ulcer or gastrointestinal bleeding. Don t give aspirin to anyone younger than 18 years of age who is ill with a fever. It may cause severe liver damage.    Your appetite may be poor, so a light diet is fine.    Drink 6 to 8 glasses of fluids every day to make sure you are getting enough fluids. Beverages can include water, sport drinks, sodas without caffeine, juices, tea, or soup. Fluids will help loosen secretions in the lung. This will make it easier for you to cough up the phlegm (sputum). If you also have heart or kidney disease, check with your healthcare provider before you drink extra fluids.    Take antibiotic medicine prescribed until it is all gone, even if you are feeling better after a few days.  Follow-up care  Follow up with your healthcare provider in the next 2 to 3 days, or as advised. This is to be sure the medicine is helping you get better.  If you are 65 or older, you should get a pneumococcal vaccine and a yearly flu (influenza) shot. You should also get these vaccines if you have chronic lung disease like asthma, emphysema, or COPD. Recently, a second type of pneumonia vaccine has become available for everyone over 65 years old. This is in addition to the previous vaccine. Ask  your provider about this.  When to seek medical advice  Call your healthcare provider right away if any of these occur:    You don t get better within the first 48 hours of treatment    Shortness of breath gets worse    Rapid breathing (more than 25 breaths per minute)    Coughing up blood    Chest pain gets worse with breathing    Fever of 100.4 F (38 C) or higher that doesn t get better with fever medicine    Weakness, dizziness, or fainting that gets worse    Thirst or dry mouth that gets worse    Sinus pain, headache, or a stiff neck    Chest pain not caused by coughing  Date Last Reviewed: 1/1/2017 2000-2017 [employer]. [address] [place] [address]. All rights reserved. This information is not intended as a substitute for professional medical care. Always follow your healthcare professional's instructions.         *CHEST PAIN, UNCERTAIN CAUSE    Based on your exam today, the exact cause of your chest pain is not certain. Your condition does not seem serious at this time, and your pain does not appear to be coming from your heart. However, sometimes the signs of a serious problem take more time to appear. Therefore, watch for the warning signs listed below.  HOME CARE:    1. Rest today and avoid strenuous activity.  2. Take any prescribed medicine as directed.  FOLLOW UP with your doctor in 1-3 days.   GET PROMPT MEDICAL ATTENTION if any of the following occur:    A change in the type of pain: if it feels different, becomes more severe, lasts longer, or begins to spread into your shoulder, arm, neck, jaw or back    Shortness of breath or increased pain with breathing    Weakness, dizziness, or fainting    Cough with blood or dark colored sputum (phlegm)    Fever over 101  F (38.3  C)    Swelling, pain or redness in one leg    [id number] [employer]. [address] [place] [address]. All rights reserved. This information is not intended as a substitute for  professional medical care. Always follow your healthcare professional's instructions.  This information has been modified by your health care provider with permission from the publisher.

## 2024-08-11 ENCOUNTER — APPOINTMENT (OUTPATIENT)
Dept: MRI IMAGING | Facility: CLINIC | Age: 44
End: 2024-08-11
Attending: EMERGENCY MEDICINE
Payer: MEDICARE

## 2024-08-11 ENCOUNTER — HOSPITAL ENCOUNTER (OUTPATIENT)
Facility: CLINIC | Age: 44
Setting detail: OBSERVATION
Discharge: HOME | End: 2024-08-12
Attending: EMERGENCY MEDICINE | Admitting: INTERNAL MEDICINE
Payer: MEDICARE

## 2024-08-11 ENCOUNTER — APPOINTMENT (OUTPATIENT)
Dept: CT IMAGING | Facility: CLINIC | Age: 44
End: 2024-08-11
Attending: EMERGENCY MEDICINE
Payer: MEDICARE

## 2024-08-11 DIAGNOSIS — H53.2 DIPLOPIA: ICD-10-CM

## 2024-08-11 DIAGNOSIS — I16.0 HYPERTENSIVE URGENCY: ICD-10-CM

## 2024-08-11 LAB
ANION GAP SERPL CALCULATED.3IONS-SCNC: 8 MMOL/L (ref 7–15)
APTT PPP: 27 SECONDS (ref 22–38)
BASOPHILS # BLD AUTO: 0.1 10E3/UL (ref 0–0.2)
BASOPHILS NFR BLD AUTO: 1 %
BUN SERPL-MCNC: 6.3 MG/DL (ref 6–20)
CALCIUM SERPL-MCNC: 9.7 MG/DL (ref 8.8–10.4)
CHLORIDE SERPL-SCNC: 100 MMOL/L (ref 98–107)
CREAT SERPL-MCNC: 0.47 MG/DL (ref 0.51–0.95)
EGFRCR SERPLBLD CKD-EPI 2021: >90 ML/MIN/1.73M2
EOSINOPHIL # BLD AUTO: 0.4 10E3/UL (ref 0–0.7)
EOSINOPHIL NFR BLD AUTO: 3 %
ERYTHROCYTE [DISTWIDTH] IN BLOOD BY AUTOMATED COUNT: 13.4 % (ref 10–15)
GLUCOSE BLDC GLUCOMTR-MCNC: 103 MG/DL (ref 70–99)
GLUCOSE BLDC GLUCOMTR-MCNC: 123 MG/DL (ref 70–99)
GLUCOSE SERPL-MCNC: 148 MG/DL (ref 70–99)
HCG INTACT+B SERPL-ACNC: <1 MIU/ML
HCO3 SERPL-SCNC: 30 MMOL/L (ref 22–29)
HCT VFR BLD AUTO: 45.8 % (ref 35–47)
HGB BLD-MCNC: 14.8 G/DL (ref 11.7–15.7)
IMM GRANULOCYTES # BLD: 0.1 10E3/UL
IMM GRANULOCYTES NFR BLD: 1 %
INR PPP: 0.96 (ref 0.85–1.15)
LYMPHOCYTES # BLD AUTO: 2 10E3/UL (ref 0.8–5.3)
LYMPHOCYTES NFR BLD AUTO: 18 %
MCH RBC QN AUTO: 29.7 PG (ref 26.5–33)
MCHC RBC AUTO-ENTMCNC: 32.3 G/DL (ref 31.5–36.5)
MCV RBC AUTO: 92 FL (ref 78–100)
MONOCYTES # BLD AUTO: 0.6 10E3/UL (ref 0–1.3)
MONOCYTES NFR BLD AUTO: 6 %
NEUTROPHILS # BLD AUTO: 7.9 10E3/UL (ref 1.6–8.3)
NEUTROPHILS NFR BLD AUTO: 72 %
NRBC # BLD AUTO: 0 10E3/UL
NRBC BLD AUTO-RTO: 0 /100
PLATELET # BLD AUTO: 258 10E3/UL (ref 150–450)
POTASSIUM SERPL-SCNC: 4.1 MMOL/L (ref 3.4–5.3)
RBC # BLD AUTO: 4.99 10E6/UL (ref 3.8–5.2)
SODIUM SERPL-SCNC: 138 MMOL/L (ref 135–145)
WBC # BLD AUTO: 11 10E3/UL (ref 4–11)

## 2024-08-11 PROCEDURE — 36415 COLL VENOUS BLD VENIPUNCTURE: CPT | Performed by: EMERGENCY MEDICINE

## 2024-08-11 PROCEDURE — 99223 1ST HOSP IP/OBS HIGH 75: CPT | Mod: AI | Performed by: INTERNAL MEDICINE

## 2024-08-11 PROCEDURE — 82962 GLUCOSE BLOOD TEST: CPT

## 2024-08-11 PROCEDURE — 80048 BASIC METABOLIC PNL TOTAL CA: CPT | Performed by: EMERGENCY MEDICINE

## 2024-08-11 PROCEDURE — 250N000011 HC RX IP 250 OP 636: Performed by: EMERGENCY MEDICINE

## 2024-08-11 PROCEDURE — 83036 HEMOGLOBIN GLYCOSYLATED A1C: CPT | Performed by: PHYSICIAN ASSISTANT

## 2024-08-11 PROCEDURE — G0378 HOSPITAL OBSERVATION PER HR: HCPCS

## 2024-08-11 PROCEDURE — 99285 EMERGENCY DEPT VISIT HI MDM: CPT | Mod: 25

## 2024-08-11 PROCEDURE — A9585 GADOBUTROL INJECTION: HCPCS | Performed by: INTERNAL MEDICINE

## 2024-08-11 PROCEDURE — 70496 CT ANGIOGRAPHY HEAD: CPT | Mod: ME

## 2024-08-11 PROCEDURE — 250N000009 HC RX 250: Performed by: EMERGENCY MEDICINE

## 2024-08-11 PROCEDURE — 84702 CHORIONIC GONADOTROPIN TEST: CPT | Performed by: EMERGENCY MEDICINE

## 2024-08-11 PROCEDURE — 96374 THER/PROPH/DIAG INJ IV PUSH: CPT | Mod: 59

## 2024-08-11 PROCEDURE — 82465 ASSAY BLD/SERUM CHOLESTEROL: CPT | Performed by: PHYSICIAN ASSISTANT

## 2024-08-11 PROCEDURE — 255N000002 HC RX 255 OP 636: Performed by: INTERNAL MEDICINE

## 2024-08-11 PROCEDURE — 85025 COMPLETE CBC W/AUTO DIFF WBC: CPT | Performed by: EMERGENCY MEDICINE

## 2024-08-11 PROCEDURE — 250N000013 HC RX MED GY IP 250 OP 250 PS 637: Performed by: INTERNAL MEDICINE

## 2024-08-11 PROCEDURE — 85730 THROMBOPLASTIN TIME PARTIAL: CPT | Performed by: EMERGENCY MEDICINE

## 2024-08-11 PROCEDURE — 70450 CT HEAD/BRAIN W/O DYE: CPT | Mod: ME,XU

## 2024-08-11 PROCEDURE — 250N000011 HC RX IP 250 OP 636: Performed by: INTERNAL MEDICINE

## 2024-08-11 PROCEDURE — 85610 PROTHROMBIN TIME: CPT | Performed by: EMERGENCY MEDICINE

## 2024-08-11 PROCEDURE — 93005 ELECTROCARDIOGRAM TRACING: CPT

## 2024-08-11 PROCEDURE — 70553 MRI BRAIN STEM W/O & W/DYE: CPT | Mod: MG

## 2024-08-11 RX ORDER — ROSUVASTATIN CALCIUM 20 MG/1
20 TABLET, COATED ORAL EVERY EVENING
COMMUNITY

## 2024-08-11 RX ORDER — ACETAMINOPHEN 650 MG/1
650 SUPPOSITORY RECTAL EVERY 4 HOURS PRN
Status: DISCONTINUED | OUTPATIENT
Start: 2024-08-11 | End: 2024-08-12 | Stop reason: HOSPADM

## 2024-08-11 RX ORDER — IOPAMIDOL 755 MG/ML
67 INJECTION, SOLUTION INTRAVASCULAR ONCE
Status: COMPLETED | OUTPATIENT
Start: 2024-08-11 | End: 2024-08-11

## 2024-08-11 RX ORDER — PRAZOSIN HYDROCHLORIDE 2 MG/1
4 CAPSULE ORAL AT BEDTIME
COMMUNITY

## 2024-08-11 RX ORDER — BUPROPION HYDROCHLORIDE 150 MG/1
150 TABLET ORAL EVERY MORNING
COMMUNITY

## 2024-08-11 RX ORDER — BUPROPION HYDROCHLORIDE 300 MG/1
300 TABLET ORAL EVERY MORNING
Status: DISCONTINUED | OUTPATIENT
Start: 2024-08-12 | End: 2024-08-11

## 2024-08-11 RX ORDER — HYDRALAZINE HYDROCHLORIDE 20 MG/ML
10 INJECTION INTRAMUSCULAR; INTRAVENOUS ONCE
Status: DISCONTINUED | OUTPATIENT
Start: 2024-08-11 | End: 2024-08-11

## 2024-08-11 RX ORDER — GABAPENTIN 300 MG/1
300 CAPSULE ORAL EVERY MORNING
Status: DISCONTINUED | OUTPATIENT
Start: 2024-08-12 | End: 2024-08-12 | Stop reason: HOSPADM

## 2024-08-11 RX ORDER — PRAZOSIN HYDROCHLORIDE 2 MG/1
4 CAPSULE ORAL EVERY EVENING
Status: DISCONTINUED | OUTPATIENT
Start: 2024-08-11 | End: 2024-08-12 | Stop reason: HOSPADM

## 2024-08-11 RX ORDER — BUPROPION HYDROCHLORIDE 150 MG/1
150 TABLET ORAL EVERY MORNING
Status: DISCONTINUED | OUTPATIENT
Start: 2024-08-12 | End: 2024-08-11

## 2024-08-11 RX ORDER — DESVENLAFAXINE 100 MG/1
100 TABLET, EXTENDED RELEASE ORAL DAILY
COMMUNITY

## 2024-08-11 RX ORDER — HYDRALAZINE HYDROCHLORIDE 20 MG/ML
10 INJECTION INTRAMUSCULAR; INTRAVENOUS EVERY 4 HOURS PRN
Status: DISCONTINUED | OUTPATIENT
Start: 2024-08-11 | End: 2024-08-12 | Stop reason: HOSPADM

## 2024-08-11 RX ORDER — PRAZOSIN HYDROCHLORIDE 2 MG/1
2 CAPSULE ORAL EVERY MORNING
COMMUNITY

## 2024-08-11 RX ORDER — ACETAMINOPHEN 325 MG/1
650 TABLET ORAL EVERY 4 HOURS PRN
Status: DISCONTINUED | OUTPATIENT
Start: 2024-08-11 | End: 2024-08-12 | Stop reason: HOSPADM

## 2024-08-11 RX ORDER — TAMSULOSIN HYDROCHLORIDE 0.4 MG/1
0.4 CAPSULE ORAL EVERY EVENING
Status: DISCONTINUED | OUTPATIENT
Start: 2024-08-11 | End: 2024-08-12 | Stop reason: HOSPADM

## 2024-08-11 RX ORDER — ALLOPURINOL 100 MG/1
100 TABLET ORAL DAILY
COMMUNITY

## 2024-08-11 RX ORDER — GABAPENTIN 300 MG/1
300 CAPSULE ORAL EVERY MORNING
COMMUNITY

## 2024-08-11 RX ORDER — ALLOPURINOL 100 MG/1
100 TABLET ORAL DAILY
Status: DISCONTINUED | OUTPATIENT
Start: 2024-08-12 | End: 2024-08-12 | Stop reason: HOSPADM

## 2024-08-11 RX ORDER — GABAPENTIN 300 MG/1
900 CAPSULE ORAL AT BEDTIME
COMMUNITY

## 2024-08-11 RX ORDER — LOSARTAN POTASSIUM 50 MG/1
50 TABLET ORAL 2 TIMES DAILY
COMMUNITY

## 2024-08-11 RX ORDER — PROCHLORPERAZINE 25 MG
25 SUPPOSITORY, RECTAL RECTAL EVERY 12 HOURS PRN
Status: DISCONTINUED | OUTPATIENT
Start: 2024-08-11 | End: 2024-08-12 | Stop reason: HOSPADM

## 2024-08-11 RX ORDER — LABETALOL HYDROCHLORIDE 5 MG/ML
10 INJECTION, SOLUTION INTRAVENOUS
Status: DISCONTINUED | OUTPATIENT
Start: 2024-08-11 | End: 2024-08-12 | Stop reason: HOSPADM

## 2024-08-11 RX ORDER — GADOBUTROL 604.72 MG/ML
10 INJECTION INTRAVENOUS ONCE
Status: COMPLETED | OUTPATIENT
Start: 2024-08-11 | End: 2024-08-11

## 2024-08-11 RX ORDER — VALACYCLOVIR HYDROCHLORIDE 500 MG/1
500 TABLET, FILM COATED ORAL DAILY
COMMUNITY

## 2024-08-11 RX ORDER — LAMOTRIGINE 100 MG/1
100 TABLET ORAL 2 TIMES DAILY
COMMUNITY

## 2024-08-11 RX ORDER — AMOXICILLIN 250 MG
1-2 CAPSULE ORAL 2 TIMES DAILY
Status: DISCONTINUED | OUTPATIENT
Start: 2024-08-11 | End: 2024-08-12 | Stop reason: HOSPADM

## 2024-08-11 RX ORDER — VALACYCLOVIR HYDROCHLORIDE 500 MG/1
500 TABLET, FILM COATED ORAL DAILY
Status: DISCONTINUED | OUTPATIENT
Start: 2024-08-12 | End: 2024-08-12 | Stop reason: HOSPADM

## 2024-08-11 RX ORDER — OXYBUTYNIN CHLORIDE 10 MG/1
20 TABLET, EXTENDED RELEASE ORAL AT BEDTIME
COMMUNITY

## 2024-08-11 RX ORDER — ROSUVASTATIN CALCIUM 20 MG/1
20 TABLET, COATED ORAL EVERY EVENING
Status: DISCONTINUED | OUTPATIENT
Start: 2024-08-11 | End: 2024-08-12 | Stop reason: HOSPADM

## 2024-08-11 RX ORDER — LAMOTRIGINE 100 MG/1
100 TABLET ORAL 2 TIMES DAILY
Status: DISCONTINUED | OUTPATIENT
Start: 2024-08-11 | End: 2024-08-12 | Stop reason: HOSPADM

## 2024-08-11 RX ORDER — PROCHLORPERAZINE MALEATE 10 MG
10 TABLET ORAL EVERY 6 HOURS PRN
Status: DISCONTINUED | OUTPATIENT
Start: 2024-08-11 | End: 2024-08-12 | Stop reason: HOSPADM

## 2024-08-11 RX ORDER — ARIPIPRAZOLE 5 MG/1
5 TABLET ORAL DAILY
Status: DISCONTINUED | OUTPATIENT
Start: 2024-08-11 | End: 2024-08-12 | Stop reason: HOSPADM

## 2024-08-11 RX ORDER — CLONAZEPAM 0.5 MG/1
1 TABLET ORAL 3 TIMES DAILY PRN
Status: DISCONTINUED | OUTPATIENT
Start: 2024-08-11 | End: 2024-08-12 | Stop reason: HOSPADM

## 2024-08-11 RX ORDER — FAMOTIDINE 40 MG/1
40 TABLET, FILM COATED ORAL 2 TIMES DAILY
COMMUNITY

## 2024-08-11 RX ORDER — CLONAZEPAM 1 MG/1
1 TABLET ORAL 3 TIMES DAILY PRN
COMMUNITY

## 2024-08-11 RX ORDER — ARIPIPRAZOLE 5 MG/1
5 TABLET ORAL DAILY
COMMUNITY

## 2024-08-11 RX ORDER — LOSARTAN POTASSIUM 50 MG/1
50 TABLET ORAL 2 TIMES DAILY
Status: DISCONTINUED | OUTPATIENT
Start: 2024-08-11 | End: 2024-08-12 | Stop reason: HOSPADM

## 2024-08-11 RX ORDER — DESVENLAFAXINE 50 MG/1
100 TABLET, FILM COATED, EXTENDED RELEASE ORAL DAILY
Status: DISCONTINUED | OUTPATIENT
Start: 2024-08-12 | End: 2024-08-12 | Stop reason: HOSPADM

## 2024-08-11 RX ORDER — PRAZOSIN HYDROCHLORIDE 2 MG/1
2 CAPSULE ORAL EVERY MORNING
Status: DISCONTINUED | OUTPATIENT
Start: 2024-08-12 | End: 2024-08-12 | Stop reason: HOSPADM

## 2024-08-11 RX ORDER — METOPROLOL TARTRATE 50 MG
50 TABLET ORAL 2 TIMES DAILY
COMMUNITY

## 2024-08-11 RX ORDER — METOPROLOL TARTRATE 50 MG
50 TABLET ORAL 2 TIMES DAILY
Status: DISCONTINUED | OUTPATIENT
Start: 2024-08-11 | End: 2024-08-12 | Stop reason: HOSPADM

## 2024-08-11 RX ORDER — BUPROPION HYDROCHLORIDE 300 MG/1
300 TABLET ORAL EVERY MORNING
COMMUNITY

## 2024-08-11 RX ORDER — TAMSULOSIN HYDROCHLORIDE 0.4 MG/1
0.4 CAPSULE ORAL EVERY EVENING
COMMUNITY

## 2024-08-11 RX ORDER — OXYBUTYNIN CHLORIDE 10 MG/1
20 TABLET, EXTENDED RELEASE ORAL EVERY EVENING
Status: DISCONTINUED | OUTPATIENT
Start: 2024-08-11 | End: 2024-08-12 | Stop reason: HOSPADM

## 2024-08-11 RX ORDER — ACETAMINOPHEN 325 MG/1
325-650 TABLET ORAL EVERY 6 HOURS PRN
COMMUNITY

## 2024-08-11 RX ORDER — GABAPENTIN 300 MG/1
900 CAPSULE ORAL EVERY EVENING
Status: DISCONTINUED | OUTPATIENT
Start: 2024-08-11 | End: 2024-08-12 | Stop reason: HOSPADM

## 2024-08-11 RX ORDER — CARBOXYMETHYLCELLULOSE SODIUM 5 MG/ML
1 SOLUTION/ DROPS OPHTHALMIC 3 TIMES DAILY PRN
COMMUNITY

## 2024-08-11 RX ADMIN — ARIPIPRAZOLE 5 MG: 5 TABLET ORAL at 20:24

## 2024-08-11 RX ADMIN — GABAPENTIN 900 MG: 300 CAPSULE ORAL at 20:04

## 2024-08-11 RX ADMIN — SENNOSIDES AND DOCUSATE SODIUM 1 TABLET: 50; 8.6 TABLET ORAL at 20:05

## 2024-08-11 RX ADMIN — OXYBUTYNIN CHLORIDE 20 MG: 10 TABLET, EXTENDED RELEASE ORAL at 20:04

## 2024-08-11 RX ADMIN — PRAZOSIN HYDROCHLORIDE 4 MG: 2 CAPSULE ORAL at 20:04

## 2024-08-11 RX ADMIN — TAMSULOSIN HYDROCHLORIDE 0.4 MG: 0.4 CAPSULE ORAL at 20:05

## 2024-08-11 RX ADMIN — CLONAZEPAM 1 MG: 0.5 TABLET ORAL at 22:22

## 2024-08-11 RX ADMIN — LAMOTRIGINE 100 MG: 100 TABLET ORAL at 20:05

## 2024-08-11 RX ADMIN — HYDRALAZINE HYDROCHLORIDE 10 MG: 20 INJECTION INTRAMUSCULAR; INTRAVENOUS at 17:59

## 2024-08-11 RX ADMIN — LOSARTAN POTASSIUM 50 MG: 50 TABLET, FILM COATED ORAL at 20:05

## 2024-08-11 RX ADMIN — ROSUVASTATIN CALCIUM 20 MG: 20 TABLET, FILM COATED ORAL at 20:05

## 2024-08-11 RX ADMIN — ACETAMINOPHEN 650 MG: 325 TABLET, FILM COATED ORAL at 17:57

## 2024-08-11 RX ADMIN — SODIUM CHLORIDE 100 ML: 9 INJECTION, SOLUTION INTRAVENOUS at 13:40

## 2024-08-11 RX ADMIN — RIVAROXABAN 20 MG: 10 TABLET, FILM COATED ORAL at 20:24

## 2024-08-11 RX ADMIN — IOPAMIDOL 67 ML: 755 INJECTION, SOLUTION INTRAVENOUS at 13:39

## 2024-08-11 RX ADMIN — METOPROLOL TARTRATE 50 MG: 50 TABLET, FILM COATED ORAL at 20:05

## 2024-08-11 RX ADMIN — GADOBUTROL 10 ML: 604.72 INJECTION INTRAVENOUS at 23:18

## 2024-08-11 ASSESSMENT — ACTIVITIES OF DAILY LIVING (ADL)
ADLS_ACUITY_SCORE: 18
WEAR_GLASSES_OR_BLIND: NO
ADLS_ACUITY_SCORE: 18
ADLS_ACUITY_SCORE: 35
CHANGE_IN_FUNCTIONAL_STATUS_SINCE_ONSET_OF_CURRENT_ILLNESS/INJURY: NO
ADLS_ACUITY_SCORE: 18
ADLS_ACUITY_SCORE: 35
DOING_ERRANDS_INDEPENDENTLY_DIFFICULTY: NO
HEARING_DIFFICULTY_OR_DEAF: NO
ADLS_ACUITY_SCORE: 35
FALL_HISTORY_WITHIN_LAST_SIX_MONTHS: NO
DIFFICULTY_COMMUNICATING: NO
ADLS_ACUITY_SCORE: 35
ADLS_ACUITY_SCORE: 18
DIFFICULTY_EATING/SWALLOWING: NO
TOILETING_ISSUES: NO
ADLS_ACUITY_SCORE: 35
ADLS_ACUITY_SCORE: 18
WALKING_OR_CLIMBING_STAIRS_DIFFICULTY: NO
ADLS_ACUITY_SCORE: 35
DRESSING/BATHING_DIFFICULTY: NO
CONCENTRATING,_REMEMBERING_OR_MAKING_DECISIONS_DIFFICULTY: NO

## 2024-08-11 ASSESSMENT — COLUMBIA-SUICIDE SEVERITY RATING SCALE - C-SSRS
2. HAVE YOU ACTUALLY HAD ANY THOUGHTS OF KILLING YOURSELF IN THE PAST MONTH?: NO
1. IN THE PAST MONTH, HAVE YOU WISHED YOU WERE DEAD OR WISHED YOU COULD GO TO SLEEP AND NOT WAKE UP?: NO
6. HAVE YOU EVER DONE ANYTHING, STARTED TO DO ANYTHING, OR PREPARED TO DO ANYTHING TO END YOUR LIFE?: NO

## 2024-08-11 NOTE — ED NOTES
[facility]  ED Nurse Handoff Report    ED Chief complaint: Eye Problem and Tingling      ED Diagnosis:   Final diagnoses:   Diplopia   Hypertensive urgency       Code Status: Full Code    Allergies:   Allergies   Allergen Reactions    Ciprofloxacin Hives    Imitrex [Sumatriptan] Other (See Comments)     dystonia    Losartan Hives and Cough       Patient Story: BIBA from car for double vision. Pt had double vision last night at 7 pm with tingling in right face, continued upon going to bed. PT woke up with double vision and the tingling gone. Pt. Here from [place] with son and was driving seeing double vision, when the call for EMS was completed.   Focused Assessment:  double vision at distance (not up close), R facial parethesia, HTN improving  CTA Head Neck with Contrast   Final Result   IMPRESSION:    HEAD CT:   1.  No acute intracranial hemorrhage.      HEAD CTA:    1.  Normal CTA [place] [facility] [place].      NECK CTA:   1.  Normal neck CTA.      Head CT w/o contrast   Final Result   IMPRESSION:   1.  No acute intracranial hemorrhage.      MR Brain w/o & w Contrast    (Results Pending)     Labs Ordered and Resulted from Time of ED Arrival to Time of ED Departure   BASIC METABOLIC PANEL - Abnormal       Result Value    Sodium 138      Potassium 4.1      Chloride 100      Carbon Dioxide (CO2) 30 (*)     Anion Gap 8      Urea Nitrogen 6.3      Creatinine 0.47 (*)     GFR Estimate >90      Calcium 9.7      Glucose 148 (*)    INR - Normal    INR 0.96     PARTIAL THROMBOPLASTIN TIME - Normal    aPTT 27     CBC WITH PLATELETS AND DIFFERENTIAL    WBC Count 11.0      RBC Count 4.99      Hemoglobin 14.8      Hematocrit 45.8      MCV 92      MCH 29.7      MCHC 32.3      RDW 13.4      Platelet Count 258      % Neutrophils 72      % Lymphocytes 18      % Monocytes 6      % Eosinophils 3      % Basophils 1      % Immature Granulocytes 1      NRBCs per 100 WBC 0      Absolute Neutrophils 7.9      Absolute Lymphocytes 2.0       Absolute Monocytes 0.6      Absolute Eosinophils 0.4      Absolute Basophils 0.1      Absolute Immature Granulocytes 0.1      Absolute NRBCs 0.0     HCG QUANTITATIVE PREGNANCY         Treatments and/or interventions provided:   Medications   hydrALAZINE (APRESOLINE) injection 10 mg ( Intravenous Canceled Entry 8/11/24 1545)   Saline flush (100 mLs Intravenous $Given 8/11/24 1340)   iopamidol (ISOVUE-370) solution 67 mL (67 mLs Intravenous $Given 8/11/24 1339)       Patient's response to treatments and/or interventions: tolerated    To be done/followed up on inpatient unit:  MRI (neurostim remote being driven here from [place]) - will need anxiolytic before scan    Does this patient have any cognitive concerns?:  n/a    Activity level - Baseline/Home:  Independent  Activity Level - Current:   Independent    Patient's Preferred language: English   Needed?: No    Isolation: None  Infection: Not Applicable  Patient tested for COVID 19 prior to admission: NO  Bariatric?: Yes    Vital Signs:   Vitals:    08/11/24 1244 08/11/24 1251 08/11/24 1300 08/11/24 1543   BP: (!) 219/136 (!) 196/128 (!) 163/96 (!) 152/97   Pulse: 83 82 82 79   Resp: 20      Temp: 97  F (36.1  C)      TempSrc: Temporal      SpO2: 96% 96% 94% 95%       Cardiac Rhythm:     Was the PSS-3 completed:   Yes  What interventions are required if any?               Family Comments: child present with patient in ED  OBS brochure/video discussed/provided to patient/family: No    For the majority of the shift this patient's behavior was Green.   Behavioral interventions performed were care and rounding.    ED NURSE PHONE NUMBER: *[phone]          No

## 2024-08-11 NOTE — PROGRESS NOTES
RECEIVING UNIT ED HANDOFF REVIEW    ED Nurse Handoff Report was reviewed by: Prince Sandy RN on August 11, 2024 at 4:37 PM

## 2024-08-11 NOTE — ED NOTES
Bed: ED01  Expected date:   Expected time:   Means of arrival:   Comments:  Zenaida [address]90 44F double vision, on thinners.

## 2024-08-11 NOTE — ED TRIAGE NOTES
BIBA from car for double vision. Pt had double vision last night at 7 pm with tingling in right face, continued upon going to bed. PT woke up with double vision and the tingling gone. Pt. Here from [place] with son and was driving seeing double vision, when the call for EMS was completed.      Triage Assessment (Adult)       Row Name 08/11/24 1245          Triage Assessment    Airway WDL WDL        Respiratory WDL    Respiratory WDL WDL        Skin Circulation/Temperature WDL    Skin Circulation/Temperature WDL WDL        Cardiac WDL    Cardiac WDL X  htn        Peripheral/Neurovascular WDL    Peripheral Neurovascular WDL WDL        Cognitive/Neuro/Behavioral WDL    Cognitive/Neuro/Behavioral WDL X  headache

## 2024-08-11 NOTE — CONSULTS
"  [facility]    Stroke Telephone Note    I was called by Amrit Grijalva on 08/11/24 regarding patient Johana Rodriguez. 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    Vitals  BP: (!) 196/128   Pulse: 82   Resp: 20   Temp: 97  F (36.1  C)        Imaging Findings  CT head: cisterna magna, no hemorrhage   CTA head/neck: no LVO    Impression    Diplopia   R facial paresthesia     Recommendations  - Neurochecks and Vital Signs every 4 hrs   -BP goal <180 SBP.   - Continue daily PTA Xarelto   - MRI Brain/orbits with and without contrast; if unable to get MRI brain due to bladder stimulator, can repeat a CT head tomorrow for any interval changes.   - 24-hour Telemetry  - PT/OT/SLP  - Euthermia, Euglycemia  -Follow up w/ Ophthalmology as outpt    My recommendations are based on the information provided over the phone by Johana Rodriguez's in-person providers. They are not intended to replace the clinical judgment of her in-person providers. I was not requested to personally see or examine the patient at this time.     The Stroke Staff is Dr. Ashby.    Tien Ruiz MD  Vascular Neurology Fellow    To page me or covering stroke neurology team member, click here: [facility]  Choose \"On Call\" tab at top, then select \"NEUROLOGY/ALL SITES\" from middle drop-down box, press Enter, then look for \"stroke\" or \"telestroke\" for your site.   "

## 2024-08-11 NOTE — PHARMACY-ADMISSION MEDICATION HISTORY
Pharmacist Admission Medication History    Admission medication history is complete. The information provided in this note is only as accurate as the sources available at the time of the update.    Information Source(s): Patient and CareEverywhere/SureScripts via in-person    Pertinent Information: pt states that she hasn't had any of her medications since 8/9 because she forgot to bring them with her and she is from out of town. She gets them all packaged in strips from the pharmacy.     Changes made to PTA medication list:  Added: all meds  Deleted: None  Changed: None    Allergies reviewed with patient and updates made in EHR: yes. Pt confirmed allergies (including losartan) but also states that she takes losartan and has an extensive refill history for this.    Medication History Completed By: Mohini Peng RP 8/11/2024 4:16 PM    PTA Med List   Medication Sig Last Dose    acetaminophen (TYLENOL) 325 MG tablet Take 325-650 mg by mouth every 6 hours as needed for mild pain prn    allopurinol (ZYLOPRIM) 100 MG tablet Take 100 mg by mouth daily 8/9/2024 at am    ARIPiprazole (ABILIFY) 5 MG tablet Take 5 mg by mouth daily 8/9/2024 at am    buPROPion (WELLBUTRIN XL) 150 MG 24 hr tablet Take 150 mg by mouth every morning With 300mg for total of 450mg 8/9/2024 at am    buPROPion (WELLBUTRIN XL) 300 MG 24 hr tablet Take 300 mg by mouth every morning With 150mg for total of 450mg 8/9/2024 at am    carboxymethylcellulose PF (REFRESH PLUS) 0.5 % ophthalmic solution Place 1 drop into both eyes 3 times daily as needed for dry eyes prn    clonazePAM (KLONOPIN) 1 MG tablet Take 1 mg by mouth 3 times daily as needed for anxiety prn    desvenlafaxine (PRISTIQ) 100 MG 24 hr tablet Take 100 mg by mouth daily 8/9/2024 at am    famotidine (PEPCID) 40 MG tablet Take 40 mg by mouth 2 times daily 8/9/2024    Fluticasone-Umeclidin-Vilanterol (TRELEGY ELLIPTA) 200-62.5-25 MCG/ACT oral inhaler Inhale 1 puff into the lungs daily as needed  prn    gabapentin (NEURONTIN) 300 MG capsule Take 300 mg by mouth every morning 8/9/2024 at am    gabapentin (NEURONTIN) 300 MG capsule Take 900 mg by mouth at bedtime Past Week    lamoTRIgine (LAMICTAL) 100 MG tablet Take 100 mg by mouth 2 times daily 8/9/2024    linaclotide (LINZESS) 145 MCG capsule Take 145 mcg by mouth every morning (before breakfast) 8/9/2024 at am    losartan (COZAAR) 50 MG tablet Take 50 mg by mouth 2 times daily 8/9/2024    metoprolol tartrate (LOPRESSOR) 50 MG tablet Take 50 mg by mouth 2 times daily 8/9/2024    oxyBUTYnin ER (DITROPAN XL) 10 MG 24 hr tablet Take 20 mg by mouth at bedtime Past Week    prazosin (MINIPRESS) 2 MG capsule Take 2 mg by mouth every morning 8/9/2024    prazosin (MINIPRESS) 2 MG capsule Take 4 mg by mouth at bedtime     rivaroxaban ANTICOAGULANT (XARELTO) 20 MG TABS tablet Take 20 mg by mouth daily (with dinner) 8/9/2024 at pm    rosuvastatin (CRESTOR) 20 MG tablet Take 20 mg by mouth every evening Past Week    tamsulosin (FLOMAX) 0.4 MG capsule Take 0.4 mg by mouth every evening Past Week    valACYclovir (VALTREX) 500 MG tablet Take 500 mg by mouth daily 8/9/2024 at am

## 2024-08-11 NOTE — ED PROVIDER NOTES
Emergency Department Note      History of Present Illness     Chief Complaint   Eye Problem and Tingling    HPI   Johana Rodriguez is a 44 year old female, anticoagulated on Xarelto, with a history of cerebral infarction, polycythemia, and hypertension who presents to the ED with double vision. She explains that roughly a year ago she underwent surgery for a treatment of a blood clot behind her eye. At this time she was having double vision and headaches. She presents today because last night she developed similar double vision and a headache that has since persisted. She adds that last night she also suffered tingling over the right side of her face. She notes that this double vision is more pronounced when looking at objects at a distance, denies any loss of vision. She states her headache feels similar to those associated with her high blood pressure, she has not taken her metoprolol and hydrochlorothiazide today. Denies any balance issues.     Independent Historian   None    Review of External Notes   Reviewed ophthalmology note from August 2023. She had strabismus surgery for esotropia. She had double vision for which she was prescribed prism glasses.     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  Arthritis  Esotropia  Spells  Cyst arachnoid  Nodule pulmonary solitary  Parasomnia  Myalgia  Depression  Hemochromatosis  Urge incontinence  TIA  Hirsutism  Hypercalciuria  Eating disorder  Nightmare disorder  Fibromyalgia  LALO  Morbid obesity  Polycythemia secondary  IBS  Polycystic ovary syndrome  Hypertension  PTSD  Hyperlipidemia  BPD  ADD  Migraine  Kidney stone  Diverticulosis    Medications   Apresoline  Abilify  Lopressor  Ditropan  Wellbutrin  Crestor  Xarelto  Cozaar  Trelegy Ellipta  Zyloprim  Pristiq  Flomax  Linzess  Neurontin   Prazosin  Klonopin     Surgical History   Extracorporeal shockwave lithotripsy of  the kidney  Fasciotomy of foot  Decompression of median nerve x2  Neuroplasty and transposition of median nerve at carpal tunnel  Sinus surgery  Dilation and curettage  Arthrodesis foot  Sacral nerve stimulator  Colonoscopy  Cystourethroscopy with placement of ureteral stent  Ureteroscopy   Cath ICD  Strabismus first surgery two muscle  Total hysterectomy     Physical Exam     Patient Vitals for the past 24 hrs:   BP Temp Temp src Pulse Resp SpO2   08/11/24 1704 (!) 178/120 98.1  F (36.7  C) Oral -- 20 --   08/11/24 1543 (!) 152/97 -- -- 79 -- 95 %   08/11/24 1300 (!) 163/96 -- -- 82 -- 94 %   08/11/24 1251 (!) 196/128 -- -- 82 -- 96 %   08/11/24 1244 (!) 219/136 97  F (36.1  C) Temporal 83 20 96 %     Physical Exam  VS: Reviewed per above  HENT: Mucous membranes moist, no nuchal rigidity  EYES: sclera anicteric, PERRL, EOMI  CV: Rate as noted, regular rhythm.   RESP: Effort normal. Breath sounds are normal bilaterally.  GI: no tenderness/rebound/guarding, not distended.  NEURO: GCS 15, cranial nerves II through XII are intact, 5 out of 5 strength in all 4 extremities, sensation is intact light touch in all 4 extremities  MSK: No deformity of the extremities  SKIN: Warm and dry    Diagnostics     Lab Results   Labs Ordered and Resulted from Time of ED Arrival to Time of ED Departure   BASIC METABOLIC PANEL - Abnormal       Result Value    Sodium 138      Potassium 4.1      Chloride 100      Carbon Dioxide (CO2) 30 (*)     Anion Gap 8      Urea Nitrogen 6.3      Creatinine 0.47 (*)     GFR Estimate >90      Calcium 9.7      Glucose 148 (*)    INR - Normal    INR 0.96     PARTIAL THROMBOPLASTIN TIME - Normal    aPTT 27     HCG QUANTITATIVE PREGNANCY - Normal    hCG Quantitative <1     CBC WITH PLATELETS AND DIFFERENTIAL    WBC Count 11.0      RBC Count 4.99      Hemoglobin 14.8      Hematocrit 45.8      MCV 92      MCH 29.7      MCHC 32.3      RDW 13.4      Platelet Count 258      % Neutrophils 72      % Lymphocytes  18      % Monocytes 6      % Eosinophils 3      % Basophils 1      % Immature Granulocytes 1      NRBCs per 100 WBC 0      Absolute Neutrophils 7.9      Absolute Lymphocytes 2.0      Absolute Monocytes 0.6      Absolute Eosinophils 0.4      Absolute Basophils 0.1      Absolute Immature Granulocytes 0.1      Absolute NRBCs 0.0         Imaging   CTA Head Neck with Contrast   Final Result   IMPRESSION:    HEAD CT:   1.  No acute intracranial hemorrhage.      HEAD CTA:    1.  Normal CTA [place].      NECK CTA:   1.  Normal neck CTA.      Head CT w/o contrast   Final Result   IMPRESSION:   1.  No acute intracranial hemorrhage.      MR Brain w/o & w Contrast    (Results Pending)     EKG   ECG taken at 1432, ECG read at 1440  Normal sinus rhythm  Nonspecific T wave abnormality  Prolonged QT  Abnormal ECG    QT newly prolonged as compared to prior, dated 7/17/18.  Rate 81 bpm. OK interval 158 ms. QRS duration 84 ms. QT/QTc 442/513 ms. P-R-T axes 46 46 17.      ED Course      Medications Administered   Medications   hydrALAZINE (APRESOLINE) injection 10 mg ( Intravenous Canceled Entry 8/11/24 1545)   prochlorperazine (COMPAZINE) injection 10 mg (has no administration in time range)     Or   prochlorperazine (COMPAZINE) tablet 10 mg (has no administration in time range)     Or   prochlorperazine (COMPAZINE) suppository 25 mg (has no administration in time range)   senna-docusate (SENOKOT-S/PERICOLACE) 8.6-50 MG per tablet 1-2 tablet (has no administration in time range)   acetaminophen (TYLENOL) tablet 650 mg (has no administration in time range)     Or   acetaminophen (TYLENOL) Suppository 650 mg (has no administration in time range)   labetalol (NORMODYNE/TRANDATE) injection 10 mg (has no administration in time range)   hydrALAZINE (APRESOLINE) injection 10 mg (has no administration in time range)   Saline flush (100 mLs Intravenous $Given 8/11/24 1340)   iopamidol (ISOVUE-370) solution 67 mL (67 mLs Intravenous  $Given 8/11/24 5570)       Procedures   Procedures         ED Course   ED Course as of 08/11/24 1728   Sun Aug 11, 2024   1242 I obtained history and examined the patient as noted above.   1325 I consulted with the stroke team regarding the patients symptoms.   1443 I consulted with stroke neurology regarding the patient bladder stimulator complicating MRI.   1505 I spoke with stroke team- plan to admit to sort out MRI or repeat CT head tomorrow around 9am.  BP <180 systolic         Medical Decision Making / Diagnosis         MDM   Johana Rodirguez is a 44 year old female who presents to the ER for evaluation of less than 1 day of binocular diplopia.  On arrival she is hypertensive up to 219 systolic.  On exam I do not appreciate focal neurological deficit.  Initial CT of the head and CT of the head and neck did not reveal ICH or large vessel occlusion or aneurysm or dissection.  Unfortunately, due to bladder stimulator without her remote control he is accessible, MRI could not be pursued from the ER.  In discussion with stroke neurology, plan for hospital admission for repeat head CT in the morning or MRI when able.  Recommended blood pressure control less than 180 systolic.  Patient updated at the bedside regarding plan.  She is agreeable.    Disposition   The patient was admitted to the hospital- Dr Valencia    Diagnosis     ICD-10-CM    1. Diplopia  H53.2       2. Hypertensive urgency  I16.0            Discharge Medications   Current Discharge Medication List            IRADHA, am serving as a scribe at 12:41 PM on 8/11/2024 to document services personally performed by Armit Grijalva MD based on my observations and the provider's statements to me.      Amrit Grijalva MD  08/11/24 1782

## 2024-08-11 NOTE — H&P
[facility]    History and Physical  Hospitalist       Date of Admission:  8/11/2024    Assessment & Plan   Johana Rodriguez is a 44 year old female with multiple medical problems who presents with strokelike symptoms.    Strokelike symptoms  -Presents with diplopia and tingling and numbness of right side of her face  -Initial CT head and CT angiogram of the head and neck is unremarkable  -MRI of the brain and orbits with and without contrast if able to complete(patient has a bladder stimulator in place).  If unable, will repeat CT head tomorrow  -Neurology consult  -Telemetry  -PT/OT/SLP  -If stroke workup is negative she will need outpatient ophthalmology follow-up.  She has a history of Strabismus surgery for Esotropia in 2023 and reportedly had problems with diplopia at that time also.    Chronic migraine without aura  -Patient endorses mild headache, she does not feel like she has a acute migraine attack at this time  -Unclear if above symptoms could be due to migraine variant although with mild headache doubt that to be the case.  -Monitor    Secondary polycythemia with history of DVT and stroke  -Follows up with hematology [facility]  -Hemoglobin is within normal limits today at 14.8, no active issues at the moment  -Continue prior to admission Xarelto    Essential hypertension  Hypertensive urgency  Hyperlipidemia  -Initial blood pressure was quite elevated in the emergency room at 219/136  -Per stroke neurology BP goal is less than 180 systolic  -Resume prior to admission losartan and Crestor  -As needed hydralazine and labetalol available    Urge incontinence with bladder stimulator in place  -No active issues, continue prior to admission Ditropan    Obstructive sleep apnea  Severe obesity  -Resume CPAP/BiPAP per home setting  -She is being worked up as outpatient for bariatric surgery and is scheduled to undergo strict bypass surgery later this year.    History of  transient alertness of awareness and memory dysfunction which were felt to be nonepileptic  -Previously evaluated by neurology at [facility]    Prolonged QT  -Patient has a known history of prolonged QT, QTc today is 513, avoid QT prolonging medication    Inflammatory arthritis    Depression  Anxiety  ADHD  -Resume prior to admission regimen once verified by the pharmacy    Clinically Significant Risk Factors Present on Admission                                  Medical Decision Making       **CLEAR ALL SELECTIONS**        DVT Prophylaxis: DOAC  Code Status: Full Code    Disposition: Expected discharge in less than 2 days.    Chart documentation was completed, in part, with [employer] voice-recognition software. Even though reviewed, some grammatical, spelling, and word errors may remain.    Hector Valencia MD    Primary Care Physician   Sergei Nation    Chief Complaint   Strokelike symptoms    History is obtained from the patient    History of Present Illness   Johana Rodriguez is a 44 year old female who presents with strokelike symptoms.  Patient is from [facility] and was for the weekend visiting [place] with her son when she started having sudden onset diplopia which started yesterday evening associated with right-sided facial tingling and numbness.  She continues to have ongoing diplopia which is corrected by covering 1 eye at a time.  She denies decrease in visual acuity, no blurry vision.  Endorses headache which is 5/10 in intensity.  Denies slurring of speech, focal tingling numbness or weakness of either arm or extremities.  Patient mentioned that she has had history of diplopia previously and required strabismus corrective surgery in 2023 and diplopia resolved after that until it occurred again last night.  She denies chest pain, shortness of breath or palpitations.  She was in her usual state of health.  No nausea, vomiting or diarrhea.  No dysuria urinary urgency or frequency.  No  hematochezia or melena or bleeding from any source    In the emergency room the patient evaluated for possible concern for stroke.  CT head, CT angiogram of the head and neck was unremarkable.  MRI of the brain was requested however that is pending at this time until her bladder stimulator can be programmed to MRI compatible mode after remote is brought in by her family.    Past Medical History    I have reviewed this patient's medical history and updated it with pertinent information if needed.   Past Medical History:   Diagnosis Date    Anxiety     Cerebral infarction (H)     Hypertension        Past Surgical History   I have reviewed this patient's surgical history and updated it with pertinent information if needed.  No past surgical history on file.    Prior to Admission Medications   None     Allergies   Allergies   Allergen Reactions    Ciprofloxacin Hives    Imitrex [Sumatriptan] Other (See Comments)     dystonia    Losartan Hives and Cough       Social History   I have reviewed this patient's social history and updated it with pertinent information if needed. Johana Rodriguez  reports that she has been smoking. She has never used smokeless tobacco. She reports that she does not drink alcohol.    Family History   I have reviewed this patient's family history and updated it with pertinent information if needed.   No family history on file.    Review of Systems   The 10 point Review of Systems is negative other than noted in the HPI or here.     Physical Exam   Temp: 97  F (36.1  C) Temp src: Temporal BP: (!) 196/128 Pulse: 82   Resp: 20 SpO2: 96 % O2 Device: None (Room air)    Vital Signs with Ranges  Temp:  [97  F (36.1  C)] 97  F (36.1  C)  Pulse:  [82-83] 82  Resp:  [20] 20  BP: (196-219)/(128-136) 196/128  SpO2:  [96 %] 96 %  0 lbs 0 oz    Gen: AAOX3, NAD, comfortable  HEENT: Moist oral mucosa, no pallor or icterus  Neck: Supple neck, good ROM, no stridor  Resp: CTA B/L, normal WOB; no crackles; no  wheezes  CVS- RRR, no murmur, no edema  Abd/GI: Soft, non-tender. BS- normoactive  Skin- Warm, dry, no rashes  MSK: No joint deformity; no edema  Neuro- CN- intact. Moving X 4; ?Impaired sensation on the right half of the face otherwise unremarkable neuroexam.     Data   Data reviewed today:  I personally reviewed the EKG tracing showing sinus rhythm with prolonged QT .        Recent Labs   Lab 08/11/24  1254   WBC 11.0   HGB 14.8   MCV 92      INR 0.96      POTASSIUM 4.1   CHLORIDE 100   CO2 30*   BUN 6.3   CR 0.47*   ANIONGAP 8   VIVIAN 9.7   *       Recent Results (from the past 24 hour(s))   Head CT w/o contrast    Narrative    EXAM: CT HEAD W/O CONTRAST  LOCATION: [facility]  DATE: 8/11/2024    INDICATION: on xarelto, headache, htn, diplopia  COMPARISON: None.  TECHNIQUE: Routine CT Head without IV contrast. Multiplanar reformats. Dose reduction techniques were used.    FINDINGS:  INTRACRANIAL CONTENTS: No intracranial hemorrhage, extraaxial collection, or mass effect.  No CT evidence of acute infarct. Normal parenchymal attenuation. Normal ventricles and sulci. Posterior fossa arachnoid cyst.    VISUALIZED ORBITS/SINUSES/MASTOIDS: No intraorbital abnormality. No paranasal sinus mucosal disease. No middle ear or mastoid effusion.    BONES/SOFT TISSUES: No acute abnormality.      Impression    IMPRESSION:  1.  No acute intracranial hemorrhage.   CTA Head Neck with Contrast    Narrative    EXAM: CTA HEAD NECK W CONTRAST  LOCATION: [facility]  DATE: 8/11/2024    INDICATION: on xarelto, headache, htn, diplopia  COMPARISON: None.  CONTRAST: 67 mL Isovue 370  TECHNIQUE: Head and neck CT angiogram with IV contrast. Axial helical CT images of the head and neck vessels obtained during the arterial phase of intravenous contrast administration. Axial 2D reconstructed images and multiplanar 3D MIP reconstructed   images of the head and neck vessels were  performed by the technologist. Dose reduction techniques were used. All stenosis measurements made according to NASCET criteria unless otherwise specified.    FINDINGS:     HEAD CTA:  ANTERIOR CIRCULATION: No stenosis/occlusion, aneurysm, or high flow vascular malformation. Standard [place] anatomy.    POSTERIOR CIRCULATION: No stenosis/occlusion, aneurysm, or high flow vascular malformation. Balanced vertebral arteries supply a normal basilar artery.     DURAL VENOUS SINUSES: Expected enhancement of the major dural venous sinuses.    NECK CTA:  RIGHT CAROTID: No measurable stenosis or dissection.    LEFT CAROTID: No measurable stenosis or dissection.    VERTEBRAL ARTERIES: No focal stenosis or dissection. Balanced vertebral arteries.    AORTIC ARCH: Two-vessel aortic arch. NONVASCULAR STRUCTURES: Unremarkable.      Impression    IMPRESSION:   HEAD CT:  1.  No acute intracranial hemorrhage.    HEAD CTA:   1.  Normal CTA [place].    NECK CTA:  1.  Normal neck CTA.

## 2024-08-12 ENCOUNTER — APPOINTMENT (OUTPATIENT)
Dept: CARDIOLOGY | Facility: CLINIC | Age: 44
End: 2024-08-12
Attending: INTERNAL MEDICINE
Payer: MEDICARE

## 2024-08-12 VITALS
WEIGHT: 293 LBS | HEART RATE: 78 BPM | HEIGHT: 67 IN | SYSTOLIC BLOOD PRESSURE: 124 MMHG | BODY MASS INDEX: 45.99 KG/M2 | RESPIRATION RATE: 16 BRPM | DIASTOLIC BLOOD PRESSURE: 78 MMHG | OXYGEN SATURATION: 97 % | TEMPERATURE: 97.8 F

## 2024-08-12 LAB
ATRIAL RATE - MUSE: 81 BPM
CHOLEST SERPL-MCNC: 173 MG/DL
DIASTOLIC BLOOD PRESSURE - MUSE: NORMAL MMHG
GLUCOSE BLDC GLUCOMTR-MCNC: 139 MG/DL (ref 70–99)
GLUCOSE BLDC GLUCOMTR-MCNC: 164 MG/DL (ref 70–99)
GLUCOSE BLDC GLUCOMTR-MCNC: 170 MG/DL (ref 70–99)
HBA1C MFR BLD: 6.6 %
HDLC SERPL-MCNC: 47 MG/DL
INTERPRETATION ECG - MUSE: NORMAL
LDLC SERPL CALC-MCNC: 76 MG/DL
LVEF ECHO: NORMAL
NONHDLC SERPL-MCNC: 126 MG/DL
P AXIS - MUSE: 46 DEGREES
PR INTERVAL - MUSE: 158 MS
QRS DURATION - MUSE: 84 MS
QT - MUSE: 442 MS
QTC - MUSE: 513 MS
R AXIS - MUSE: 46 DEGREES
SYSTOLIC BLOOD PRESSURE - MUSE: NORMAL MMHG
T AXIS - MUSE: 17 DEGREES
TRIGL SERPL-MCNC: 251 MG/DL
VENTRICULAR RATE- MUSE: 81 BPM

## 2024-08-12 PROCEDURE — 82962 GLUCOSE BLOOD TEST: CPT

## 2024-08-12 PROCEDURE — 99418 PROLNG IP/OBS E/M EA 15 MIN: CPT | Performed by: PHYSICIAN ASSISTANT

## 2024-08-12 PROCEDURE — 99223 1ST HOSP IP/OBS HIGH 75: CPT | Performed by: PHYSICIAN ASSISTANT

## 2024-08-12 PROCEDURE — C8929 TTE W OR WO FOL WCON,DOPPLER: HCPCS

## 2024-08-12 PROCEDURE — G0378 HOSPITAL OBSERVATION PER HR: HCPCS

## 2024-08-12 PROCEDURE — 255N000002 HC RX 255 OP 636: Performed by: INTERNAL MEDICINE

## 2024-08-12 PROCEDURE — 93306 TTE W/DOPPLER COMPLETE: CPT | Mod: 26 | Performed by: INTERNAL MEDICINE

## 2024-08-12 PROCEDURE — 250N000013 HC RX MED GY IP 250 OP 250 PS 637: Performed by: INTERNAL MEDICINE

## 2024-08-12 PROCEDURE — 999N000226 HC STATISTIC SLP IP EVAL DEFER

## 2024-08-12 PROCEDURE — 999N000208 ECHOCARDIOGRAM COMPLETE

## 2024-08-12 PROCEDURE — 99239 HOSP IP/OBS DSCHRG MGMT >30: CPT | Performed by: INTERNAL MEDICINE

## 2024-08-12 RX ADMIN — VALACYCLOVIR HYDROCHLORIDE 500 MG: 500 TABLET, FILM COATED ORAL at 10:04

## 2024-08-12 RX ADMIN — RIVAROXABAN 20 MG: 10 TABLET, FILM COATED ORAL at 17:10

## 2024-08-12 RX ADMIN — BUPROPION HYDROCHLORIDE 450 MG: 300 TABLET, EXTENDED RELEASE ORAL at 10:01

## 2024-08-12 RX ADMIN — ARIPIPRAZOLE 5 MG: 5 TABLET ORAL at 10:59

## 2024-08-12 RX ADMIN — DESVENLAFAXINE 100 MG: 50 TABLET, EXTENDED RELEASE ORAL at 10:03

## 2024-08-12 RX ADMIN — ACETAMINOPHEN 650 MG: 325 TABLET, FILM COATED ORAL at 05:42

## 2024-08-12 RX ADMIN — PRAZOSIN HYDROCHLORIDE 2 MG: 2 CAPSULE ORAL at 09:59

## 2024-08-12 RX ADMIN — HUMAN ALBUMIN MICROSPHERES AND PERFLUTREN 9 ML: 10; .22 INJECTION, SOLUTION INTRAVENOUS at 08:51

## 2024-08-12 RX ADMIN — LAMOTRIGINE 100 MG: 100 TABLET ORAL at 10:00

## 2024-08-12 RX ADMIN — LOSARTAN POTASSIUM 50 MG: 50 TABLET, FILM COATED ORAL at 10:02

## 2024-08-12 RX ADMIN — SENNOSIDES AND DOCUSATE SODIUM 2 TABLET: 50; 8.6 TABLET ORAL at 10:01

## 2024-08-12 RX ADMIN — ALLOPURINOL 100 MG: 100 TABLET ORAL at 10:01

## 2024-08-12 RX ADMIN — METOPROLOL TARTRATE 50 MG: 50 TABLET, FILM COATED ORAL at 10:00

## 2024-08-12 RX ADMIN — GABAPENTIN 300 MG: 300 CAPSULE ORAL at 10:02

## 2024-08-12 ASSESSMENT — ACTIVITIES OF DAILY LIVING (ADL)
ADLS_ACUITY_SCORE: 18

## 2024-08-12 NOTE — PLAN OF CARE
PRIMARY Concern: Double vision & right sided tingling & numbness of face  SAFETY RISK Concerns (fall risk, behaviors, etc.): None  Isolation/Type: None  Tests/Procedures for NEXT shift: Echo-Done  Consults? (Pending/following, signed-off?) Vascular Neurology signed off. General Neurology seen and signed off  Where is patient from? (Home, TCU, etc.): home ([place])  Other Important info for NEXT shift:  none  Anticipated DC date & active delays: today 8/12  _____________________________________________________________________________  SUMMARY NOTE:  Orientation/Cognitive: A&Ox4  Observation Goals (Met/ Not Met): Met  Mobility Level/Assist Equipment: SBA  Antibiotics & Plan (IV/po, length of tx left): None  Pain Management: Denies  Tele/VS/O2: Tel-NSR, VSS/RA  ABNL Lab/BG: Hgb A1C-6.6  Diet: Regular  Bowel/Bladder: Continent  Skin Concerns: dry shiva LE  Drains/Devices: PIV  Patient Stated Goal for Today:  discharge home. Stroke teaching and resources given     Observation goals  PRIOR TO DISCHARGE        List all goals to be met before discharge home      Free from ACUTE neuro deficits- Met  Testing complete-Met  Other:  Nurse to notify provider when observation goals have been met and patient is ready for discharge.

## 2024-08-12 NOTE — PLAN OF CARE
Goal Outcome Evaluation:      Plan of Care Reviewed With: patient    Overall Patient Progress: improving    Observation goals  PRIOR TO DISCHARGE        Comments: List all goals to be met before discharge home        -Free from ACUTE neuro deficits-Met    -Testing complete-Not met, Echo pending    -Other:

## 2024-08-12 NOTE — PROGRESS NOTES
Observation goals  PRIOR TO DISCHARGE        List all goals to be met before discharge home      Free from ACUTE neuro deficits-Met  Testing complete-Met  Other:  Nurse to notify provider when observation goals have been met and patient is ready for discharge.

## 2024-08-12 NOTE — PLAN OF CARE
"SLP: MRI: \"No acute intracranial abnormality.\" No NIHSS or RN dysphagia screen yet completed by RN, though documentation indicates no aphasia, dysarthria, facial droop. Paged RN to complete RN dysphagia screen and enter regular diet order in if passes. SLP to check if full evaluation is indicated, following. Updated hospitalist as well.     Addendum: Regular diet ordered. Approached pt in room and discussed SLP role re: communication/swallowing: pt denies any difficulty/concerns and pleasantly declines need for formal SLP evaluation(s). Minimal slurred speech during informal conversation, though pt reports she is tired and hx of (?intermittent) slurred speech per EMR. Of note, clinical swallow evaluation 4/20/2023 at [facility].     I educated pt of signs/sx dysphagia, acute communication changes and notify staff immediately/ seek SLP re-consult if changes/concerns arise. SLP to complete orders.   "

## 2024-08-12 NOTE — PLAN OF CARE
Goal Outcome Evaluation:    [id number]  Orientation/Cognitive: A&O X4.  Observation Goals (Met/ Not Met): Not met.   Mobility Level/Assist Equipment: Independently   Antibiotics & Plan (IV/po, length of tx left): None.   Pain Management: Complained of headache but declined medication intervention. Clonazepam given for MRI  Tele/VS/O2: Elevated blood pressure; scheduled metoprolol and losartan given.   ABNL Lab/BG: see results  Diet: NPO.  Bowel/Bladder: Continent.   Drains/Devices: PIV SL.   Procedures/Test: Currently at MRI  Patient Stated Goal for Today: Rest, complete work up.

## 2024-08-12 NOTE — PROGRESS NOTES
Observation goals  PRIOR TO DISCHARGE        List all goals to be met before discharge home      Free from ACUTE neuro deficits- partially met  Testing complete-Met  Other:  Nurse to notify provider when observation goals have been met and patient is ready for discharge.

## 2024-08-12 NOTE — CONSULTS
[facility]    Stroke Consult Note    Reason for Consult: Acute onset diplopia and right sided face numbness     Chief Complaint: Eye Problem and Tingling    HPI  Johana Rodriguez is a 44 year old female with pertinent past medical history of PCOS, borderline personality disorder, prediabetes, PTSD from several traumatic life events, HTN, HLD, secondary polycythemia, LALO with BiPAP, recurrent nephrolithiasis with urinary urge incontinence and bladder stimul, report of possible Protein S deficiency, syncope, SVT, prolonged QTc s/p loop recorder 2022, DVT on xarelto, prior stroke, neurologic spells, migraines, strabismus repair, clinical obesity being evaluated for bariatric surgery.    Detailed pertinent past medical history:    Diplopia: 12/26/22 diplopia, prescribed prism glasses, found to have small estropia, ophthomology felt possibly decompensation of childhood strabismus. MRI showed arachnoid cyst, was discussed at complex case conference and location of cyst could be associated with increased convergence tonus but has been stable so did not recommend surgery, recommended strabismus repair which was performed 7/25/23. Of note it does appear she had some work-up done for myasthenia gravis in the past which was negative    Recurrent DVTs/fetal loss: Followed with heme/onc. Prior negative thrombophilia work-up, maintained on long term anticoagulation with Xarelto. Report of protein S defiiciency although appears was in normal range on testing available.    Stroke history: Had stroke tiny lacunar L internal capsule infarct on [facility] 7/18/2023. R sided symptoms at the time but no residual symptoms. She does have poorly controlled HTN and other vascular risk factors. Loop recorder since 2022 no report of Afib.    Migraines: Chronic migraine without aura, well-controlled. She reports these occur every couple months. was seen by her neurologist, Dr. Tao at [place] 6/26/24 and at that time  reported severe headaches 1-2 x per month. Maintained on Ajovy, venlafaxine, gabapentin, metoprolol which are prescribed for other medical comorbidities but has been known to be effective in preventing migraines.     Neurologic spells:   She gets neurologic spells since March 2023 including staring off, not responding for a couple minutes, slurred speech followed by some post-ictal like confusion and fatigue. Had prolonged EEG negative for seizures. Her neurologist  felt possibly nonepileptic. Had abnormal neuropsychological testing.  Hospitalized twice April 2023:  4/17/23-4/18/23 presented to [place] with dysarthria, confusion, R face/neck/arm/leg paresthesias. Did not receive TNK/tPA as was taking Xarelto. TTE unrevealing (no atrial shunt), EEG with mild focal slowing over L temporal head suggesting focal disturb in cerebral function over L temporal region, nonspecif, no seizures or epileptiform activity. Did not have any vessel imaging performed. Felt stroke v migraine. Continued xarelto, started Crestor. Started emgality for migraines. Ordered outpatient MRI as did not have stimulator remote.  4/20/23-confusion, brain fog, throbbing pressure to R ear rhythmic pressure, paresthesias R face/leg, gait instability, vertigo leading to a fall with subsequent nausea/vomiting and urinary incontinence. CT and CTA without acute pathology. MRI negative. EEG negative. Felt topiramate could be related to the paresthesias, discontinued. Lamotrigine increased.     This admission: [facility]ed to [facility] ED 8/11/24 due to binocular horizontal diplopia and R facial paresthesias and headache and pressure to R ear since 1900 8/10. Symptoms started while driving. She came to [place] over the weekend but forgot to take her Xarelto with her so had not taken since 8/09/24. CTA h/n unremarkable. Horizontal nystagmus on exam.  /136 on presentation, normalized today with antihypertensive management. Symptoms are persistent  today.      Evaluation Summarized    MRI/Head CT MRI: Negative for acute pathology, subtle diffuse marrow signal abnormality most pronounced in DWI, new since 2018, correlate with labs for underlying hematopoietic versus metabolic derangement   Intracranial Vasculature CTA head: Normal   Cervical Vasculature CTA neck: Normal     Echocardiogram TTE 8/12/24: Mild concentric LVH, EF 60-65%, no WMA, RV normal, normal bilateral atria size,Ascending aorta dilatation is present, SR    TTE [place] 4/18/23: No atrial level shunt by color flow imaging and agitated saline contrast injection.    EKG/Telemetry SR, nonspecific T wave abnormality, prolonged QTc 513   Other Testing Not Applicable      LDL  No lab value available in past 30 days   A1C  No lab value available in past 90 days   Troponin No lab value available in past 48 hrs       Impression/Recommendations:  8/10/24 Acute Binocular horizontal diplopia and nystagmus, MRI negative for stroke and no associated abnormal posterior circulation pathology, symptoms did not resolve with normalization of blood pressure  History of Estropia felt possibly decompensation of childhood strabismus s/p repair 7/25/23  -recommend  discussion with general neurology, if no concerns or work-up negative then recommend outpatient follow-up with ophthalmology    3. Subtle diffuse marrow signal abnormality most pronounced on DWI, new since 2018, unclear if potentially related to her polycythemia  -defer to primary team to correlate with labs for underlying hematopoietic or metabolic derangement    4. History of L internal capsular ischemic infarct on [facility] 2013, young age however does have history of poorly controlled vascular risk factors, bubble study negative for PFO in the past  5. Recurrent DVT/fetal loss on chronic anticoagulation  6. Possible protein S deficiency versus other thrombophilia with both venous and arterial clotting  7. Hypertension, poorly controlled with hypertensive urgency  "on presentation  8. Hyperlipidemia  9. Prediabetes  10. Secondary polycythemia  11. SVT s/p loop recorder without finding of atrial fibrillation thus far  -Okay to continue outpatient follow-up with established neurologist and PCP for secondary stroke prevention  -recommend home blood pressure monitoring with goal <130/80  -Continue Xarelto 20 mg daily with daily compliance and Crestor 20 mg daily  -Ordered LDL and A1c, recommend goals LDL 40-70 and A1c <7% long-term    12.  Chronic migraines without aura  -Recommend ongoing follow-up with established neurologist at [place]  -Continue PTA Ajovy, venlafaxine, gabapentin, metoprolol     Patient Follow-up    - in the next 1 week(s) with PCP  -at first available with established neurologist at [place]     Thank you for this consult. Recommend general neurology consult. No further stroke work-up needed so we will sign off. Please contact us with any questions.    Edda Arnold PA-C  Vascular Neurology    To page me or covering stroke neurology team member, click here: [facility]  Choose \"On Call\" tab at top, then select \"NEUROLOGY/ALL SITES\" from middle drop-down box, press Enter, then look for \"stroke\" or \"telestroke\" for your site.  _____________________________________________________    Clinically Significant Risk Factors Present on Admission               # Drug Induced Coagulation Defect: home medication list includes an anticoagulant medication    # Hypertension: Noted on problem list         # Severe Obesity: Estimated body mass index is 53.66 kg/m  as calculated from the following:    Height as of this encounter: 1.702 m (5' 7\").    Weight as of this encounter: 155.4 kg (342 lb 9.6 oz).                 Past Medical History    Past Medical History:   Diagnosis Date    Anxiety     Cerebral infarction (H)     Hypertension      Medications   Home Meds  Prior to Admission medications    Medication Sig Start Date End Date Taking? Authorizing Provider   acetaminophen (TYLENOL) " 325 MG tablet Take 325-650 mg by mouth every 6 hours as needed for mild pain   Yes Unknown, Entered By History   allopurinol (ZYLOPRIM) 100 MG tablet Take 100 mg by mouth daily   Yes Unknown, Entered By History   ARIPiprazole (ABILIFY) 5 MG tablet Take 5 mg by mouth daily   Yes Unknown, Entered By History   buPROPion (WELLBUTRIN XL) 150 MG 24 hr tablet Take 150 mg by mouth every morning With 300mg for total of 450mg   Yes Unknown, Entered By History   buPROPion (WELLBUTRIN XL) 300 MG 24 hr tablet Take 300 mg by mouth every morning With 150mg for total of 450mg   Yes Unknown, Entered By History   carboxymethylcellulose PF (REFRESH PLUS) 0.5 % ophthalmic solution Place 1 drop into both eyes 3 times daily as needed for dry eyes   Yes Unknown, Entered By History   clonazePAM (KLONOPIN) 1 MG tablet Take 1 mg by mouth 3 times daily as needed for anxiety   Yes Unknown, Entered By History   desvenlafaxine (PRISTIQ) 100 MG 24 hr tablet Take 100 mg by mouth daily   Yes Unknown, Entered By History   famotidine (PEPCID) 40 MG tablet Take 40 mg by mouth 2 times daily   Yes Unknown, Entered By History   Fluticasone-Umeclidin-Vilanterol (TRELEGY ELLIPTA) 200-62.5-25 MCG/ACT oral inhaler Inhale 1 puff into the lungs daily as needed   Yes Unknown, Entered By History   gabapentin (NEURONTIN) 300 MG capsule Take 300 mg by mouth every morning   Yes Unknown, Entered By History   gabapentin (NEURONTIN) 300 MG capsule Take 900 mg by mouth at bedtime   Yes Unknown, Entered By History   lamoTRIgine (LAMICTAL) 100 MG tablet Take 100 mg by mouth 2 times daily   Yes Unknown, Entered By History   linaclotide (LINZESS) 145 MCG capsule Take 145 mcg by mouth every morning (before breakfast)   Yes Unknown, Entered By History   losartan (COZAAR) 50 MG tablet Take 50 mg by mouth 2 times daily   Yes Unknown, Entered By History   metoprolol tartrate (LOPRESSOR) 50 MG tablet Take 50 mg by mouth 2 times daily   Yes Unknown, Entered By History    oxyBUTYnin ER (DITROPAN XL) 10 MG 24 hr tablet Take 20 mg by mouth at bedtime   Yes Unknown, Entered By History   prazosin (MINIPRESS) 2 MG capsule Take 2 mg by mouth every morning   Yes Unknown, Entered By History   prazosin (MINIPRESS) 2 MG capsule Take 4 mg by mouth at bedtime   Yes Unknown, Entered By History   rivaroxaban ANTICOAGULANT (XARELTO) 20 MG TABS tablet Take 20 mg by mouth daily (with dinner)   Yes Unknown, Entered By History   rosuvastatin (CRESTOR) 20 MG tablet Take 20 mg by mouth every evening   Yes Unknown, Entered By History   tamsulosin (FLOMAX) 0.4 MG capsule Take 0.4 mg by mouth every evening   Yes Unknown, Entered By History   valACYclovir (VALTREX) 500 MG tablet Take 500 mg by mouth daily   Yes Unknown, Entered By History       Scheduled Meds  Current Facility-Administered Medications   Medication Dose Route Frequency Provider Last Rate Last Admin    allopurinol (ZYLOPRIM) tablet 100 mg  100 mg Oral Daily Hector Valencia MD        ARIPiprazole (ABILIFY) tablet 5 mg  5 mg Oral Daily Hector Valencia MD   5 mg at 08/11/24 2024    buPROPion (WELLBUTRIN XL) 24 hr tablet 450 mg  450 mg Oral Daily Hector Valencia MD        desvenlafaxine (PRISTIQ) 24 hr tablet 100 mg  100 mg Oral Daily Hector Valencia MD        gabapentin (NEURONTIN) capsule 300 mg  300 mg Oral QAM Hector Valencia MD        gabapentin (NEURONTIN) capsule 900 mg  900 mg Oral QPM Hector Valencia MD   900 mg at 08/11/24 2004    lamoTRIgine (LaMICtal) tablet 100 mg  100 mg Oral BID Hector Valencia MD   100 mg at 08/11/24 2005    losartan (COZAAR) tablet 50 mg  50 mg Oral BID Hector Valencia MD   50 mg at 08/11/24 2005    metoprolol tartrate (LOPRESSOR) tablet 50 mg  50 mg Oral BID Hector Valencia MD   50 mg at 08/11/24 2005    oxyBUTYnin ER (DITROPAN XL) 24 hr tablet 20 mg  20 mg Oral QPM Hector Valencia MD   20 mg at 08/11/24 2004    prazosin (MINIPRESS) capsule 2 mg  2 mg Oral Hector King MD         prazosin (MINIPRESS) capsule 4 mg  4 mg Oral QPM Hector Valencia MD   4 mg at 08/11/24 2004    rivaroxaban ANTICOAGULANT (XARELTO) tablet 20 mg  20 mg Oral Daily with supper Hector Valencia MD   20 mg at 08/11/24 2024    rosuvastatin (CRESTOR) tablet 20 mg  20 mg Oral QPM Hector Valencia MD   20 mg at 08/11/24 2005    senna-docusate (SENOKOT-S/PERICOLACE) 8.6-50 MG per tablet 1-2 tablet  1-2 tablet Oral or NG Tube BID Hector Valencia MD   1 tablet at 08/11/24 2005    tamsulosin (FLOMAX) capsule 0.4 mg  0.4 mg Oral QPM Hector Valencia MD   0.4 mg at 08/11/24 2005    valACYclovir (VALTREX) tablet 500 mg  500 mg Oral Daily Hector Valencia MD           Infusion Meds  Current Facility-Administered Medications   Medication Dose Route Frequency Provider Last Rate Last Admin       Allergies   Allergies   Allergen Reactions    Ciprofloxacin Hives    Imitrex [Sumatriptan] Other (See Comments)     dystonia    Losartan Hives and Cough     8/11/24-pt has been getting this refilled and taking for many months          PHYSICAL EXAMINATION   Temp:  [97  F (36.1  C)-98.3  F (36.8  C)] 98.1  F (36.7  C)  Pulse:  [79-89] 83  Resp:  [16-20] 16  BP: (119-219)/() 119/88  SpO2:  [93 %-98 %] 97 %    General Exam  General:  patient lying in bed without any acute distress    HEENT:  normocephalic/atraumatic  Pulmonary:  no respiratory distress    Neuro Exam  Mental Status:  alert, oriented x 3, follows commands, speech clear and fluent, naming and repetition normal  Cranial Nerves:  visual fields intact, binocular horizontal diplopia worse with looking to the Right but resolves with closing either eye, horizontal rightward beating nystagmus, PERRL, no gaze deviation, R facial paresthesias but no decreased sensation, facial movements symmetric, hearing not formally tested but intact to conversation, mild dysarthria but reports at baseline, tongue protrusion midline  Motor:  normal muscle tone and bulk, no  abnormal movements, able to move all limbs spontaneously, strength 5/5 throughout upper and lower extremities, no pronator drift  Reflexes:  toes down-going  Sensory:  light touch sensation intact and symmetric throughout upper and lower extremities, no extinction on double simultaneous stimulation   Coordination:  normal finger-to-nose and heel-to-shin bilaterally without dysmetria  Station/Gait:  deferred    Stroke Scales    NIHSS  1a. Level of Consciousness 0-->Alert, keenly responsive   1b. LOC Questions 0-->Answers both questions correctly   1c. LOC Commands 0-->Performs both tasks correctly   2.   Best Gaze (S) 0-->Normal (horizontal Rward beating nystagmus)   3.   Visual 0-->No visual loss   4.   Facial Palsy 0-->Normal symmetrical movements   5a. Motor Arm, Left 0-->No drift, limb holds 90 (or 45) degrees for full 10 secs   5b. Motor Arm, Right 0-->No drift, limb holds 90 (or 45) degrees for full 10 secs   6a. Motor Leg, Left 0-->No drift, leg holds 30 degree position for full 5 secs   6b. Motor Leg, right 0-->No drift, leg holds 30 degree position for full 5 secs   7.   Limb Ataxia 0-->Absent   8.   Sensory 0-->Normal, no sensory loss   9.   Best Language 0-->No aphasia, normal   10. Dysarthria (S) 1-->Mild-to-moderate dysarthria, patient slurs at least some words and, at worst, can be understood with some difficulty (says she feels it is at baseline)   11. Extinction and Inattention  0-->No abnormality   Total 1 (08/12/24 0823)       Imaging  I personally reviewed all imaging; relevant findings per HPI.    Labs Data   CBC  Recent Labs   Lab 08/11/24  1254   WBC 11.0   RBC 4.99   HGB 14.8   HCT 45.8        Basic Metabolic Panel   Recent Labs   Lab 08/12/24  0543 08/12/24  0046 08/11/24  2111 08/11/24  1906 08/11/24  1254   NA  --   --   --   --  138   POTASSIUM  --   --   --   --  4.1   CHLORIDE  --   --   --   --  100   CO2  --   --   --   --  30*   BUN  --   --   --   --  6.3   CR  --   --   --    "--  0.47*   * 164* 103*   < > 148*   VIVIAN  --   --   --   --  9.7    < > = values in this interval not displayed.     Liver Panel  No results for input(s): \"PROTTOTAL\", \"ALBUMIN\", \"BILITOTAL\", \"ALKPHOS\", \"AST\", \"ALT\", \"BILIDIRECT\" in the last 168 hours.  INR    Recent Labs   Lab Test 08/11/24  1254 07/17/18  1155   INR 0.96 0.94           Stroke Consult Data Data   This was a non-emergent, non-telestroke consult.  I have personally spent a total of 90 minutes providing care today, time spent in reviewing medical records and devising the plan as recorded above.   "

## 2024-08-12 NOTE — CONSULTS
REPORT OF CONSULTATION  Patient Name: Johana Rodriguez   MRN: [id number]   : 1980   Admission Date/Time: 2024 12:32 PM   Primary Care Physician: Sergei Nation   Consulting Physician: Jossy Sanz MD    REASON FOR CONSULTATION  I am asked to see this patient in consultation at the request of Hector Valencia MD for evaluation of diplopia and facial paresthesia.     CONSULTATION IMPRESSION/RECOMMENDATIONS:   Active Problems:    Diplopia    Hypertensive urgency     This is a 44 year old woman who presents with horizontal binocular diplopia. Also reported right facial paresthesia and severely elevated BP.   Currently exam is remarkable for bilateral abduction restriction and mild dysconjugation of the gaze. No Ng. No clear palsies. Normal facial sensory exam for LT, PP and temp. No other concerning findings.   Per discussion with the patient change with her eyes are very similar to the ones she had prior to her eye surgery.   Discussed that given that she doesn't have stroke per MRI it's possible that some of her ssx (like facial paresthesias) could be related to hypertensive urgency. Regarding to her diplopia  - it appears that she got decompensated from prior correction. Another reason could be her diabetes. Patient reported that her BG are normal, however, upon chart review patient's A1c is in DM range.   -recommend BG treatment per primary team  -otherwise recommend patient to see neuro-ophthalmology - patient is working on getting set up with [facility], otherwise discussed that we have neuro-ophthalmologist in our clinic, patient will let me know  -discussed BP treatments, patient is being scheduled for surgery end of the year    Will sign off, please call with questions  Please page with questions: pager [phone]  I thank Dr. Hector Valencia for the opportunity to participate in the patient's care.  "    ------------------------------------------------------------------------------------------------------    HPI: This is a 44 year old female with Hx of stroke on xarelto, esotropia, migraine (on ajovy, venlafaxine, gabapentin, metoprolol), renal stones, eating disorder,  PTSD, obesity, preDM, spells, anxiety, hemochromatosis, prolonged QT who presented yesterday due to concerns for double vision as well as right face tingling and right eye pressure which were not typical for her migraines. Patient's BP was notable over 200s. Of note she had strabismus surgery in 2023 for double vision and was Rx glasses for this concern as well.   In the ER patient's CT scans were unremarkable. She wasn't able to get MRI brain until next day due to spinal stimulator and was negative for acute stroke.     Currently patient reported that has double vision when looks far with both eyes open - images are side by side. Gets normal when she looks at 3-4 meters or closer. Started suddenly when she was driving. Patient also reported that since yesterday has intermittent right facial tingling \"like when you sit on your foot\" or \"novocaine wearing off\" sensation. Patient denied any weakness in her face, hearing changes, weakness or paresthesias in extremities.     Detailed pertinent past medical history:     Diplopia: 12/26/22 diplopia, prescribed prism glasses, found to have small estropia, ophthomology felt possibly decompensation of childhood strabismus. MRI showed arachnoid cyst, was discussed at complex case conference and location of cyst could be associated with increased convergence tonus but has been stable so did not recommend surgery, recommended strabismus repair which was performed 7/25/23. Of note it does appear she had some work-up done for myasthenia gravis in the past which was negative     Recurrent DVTs/fetal loss: Followed with heme/onc. Prior negative thrombophilia work-up, maintained on long term anticoagulation with " Xarelto. Report of protein S defiiciency although appears was in normal range on testing available.     Stroke history: Had stroke tiny lacunar L internal capsule infarct on CTH 7/18/2023. R sided symptoms at the time but no residual symptoms. She does have poorly controlled HTN and other vascular risk factors. Loop recorder since 2022 no report of Afib.     Migraines: Chronic migraine without aura, well-controlled. She reports these occur every couple months. was seen by her neurologist, Dr. Tao at [facility] 6/26/24 and at that time reported severe headaches 1-2 x per month. Maintained on Ajovy, venlafaxine, gabapentin, metoprolol which are prescribed for other medical comorbidities but has been known to be effective in preventing migraines.      Neurologic spells:   She gets neurologic spells since March 2023 including staring off, not responding for a couple minutes, slurred speech followed by some post-ictal like confusion and fatigue. Had prolonged EEG negative for seizures. Her neurologist  felt possibly nonepileptic. Had abnormal neuropsychological testing.  Hospitalized twice April 2023:  4/17/23-4/18/23 presented to [facility] with dysarthria, confusion, R face/neck/arm/leg paresthesias. Did not receive TNK/tPA as was taking Xarelto. TTE unrevealing (no atrial shunt), EEG with mild focal slowing over L temporal head suggesting focal disturb in cerebral function over L temporal region, nonspecif, no seizures or epileptiform activity. Did not have any vessel imaging performed. Felt stroke v migraine. Continued xarelto, started Crestor. Started emgality for migraines. Ordered outpatient MRI as did not have stimulator remote.  4/20/23-confusion, brain fog, throbbing pressure to R ear rhythmic pressure, paresthesias R face/leg, gait instability, vertigo leading to a fall with subsequent nausea/vomiting and urinary incontinence. CT and CTA without acute pathology. MRI negative. EEG negative. Felt topiramate could be  related to the paresthesias, discontinued. Lamotrigine increased.   PAST MEDICAL HISTORY    Past Medical History:   Diagnosis Date    Anxiety     Cerebral infarction (H)     Hypertension         PAST SURGICAL HISTORY  No past surgical history on file.     ALLERGIES/SENSITIVITIES    Allergies   Allergen Reactions    Ciprofloxacin Hives    Imitrex [Sumatriptan] Other (See Comments)     dystonia    Losartan Hives and Cough     8/11/24-pt has been getting this refilled and taking for many months         CURRENT MEDS    No current outpatient medications on file.        SOCIAL HISTORY  Social History     Socioeconomic History    Marital status: Single     Spouse name: Not on file    Number of children: Not on file    Years of education: Not on file    Highest education level: Not on file   Occupational History    Not on file   Tobacco Use    Smoking status: Every Day     Current packs/day: 1.00     Types: Cigarettes    Smokeless tobacco: Never   Substance and Sexual Activity    Alcohol use: No    Drug use: Not on file    Sexual activity: Not on file   Other Topics Concern    Not on file   Social History Narrative    Not on file     Social Determinants of Health     Financial Resource Strain: Medium Risk (5/30/2023)    Received from [facility]    Overall Financial Resource Strain (CARDIA)     Difficulty of Paying Living Expenses: Somewhat hard   Food Insecurity: Food Insecurity Present (1/3/2024)    Received from [facility]    Hunger Vital Sign     Worried About Running Out of Food in the Last Year: Sometimes true     Ran Out of Food in the Last Year: Sometimes true   Transportation Needs: No Transportation Needs (1/3/2024)    Received from [facility]    PRAPARE - Transportation     Lack of Transportation (Medical): No     Lack of Transportation (Non-Medical): No   Physical Activity: Insufficiently Active (1/3/2024)    Received from [facility]    Exercise Vital Sign     Days of Exercise per Week: 2 days     Minutes of  "Exercise per Session: 10 min   Stress: Stress Concern Present (11/28/2022)    Received from [facility]    [employer] - Occupational Stress Questionnaire     Feeling of Stress : Very much   Social Connections: Socially Isolated (11/28/2022)    Received from [facility]    Social Connection and Isolation Panel [NHANES]     Frequency of Communication with Friends and Family: Never     Frequency of Social Gatherings with Friends and Family: Never     Attends Jew Services: Never     Active Member of Clubs or Organizations: No     Attends Club or Organization Meetings: Never     Marital Status:    Interpersonal Safety: Not At Risk (5/30/2023)    Received from [facility]    Humiliation, Afraid, Rape, and Kick questionnaire     Fear of Current or Ex-Partner: No     Emotionally Abused: No     Physically Abused: No     Sexually Abused: No   Housing Stability: Low Risk  (1/3/2024)    Received from [facility]    Housing Stability     What is your living situation today?: I have a steady place to live       FAMILY HISTORY  No family history on file.     REVIEW OF SYSTEMS: Systems (general, cardiovascular, respiratory, ENT/eyes, GI, , musculo-skeletal, neuro, psychiatric, allergology-immunologic and endocrine) were reviewed with pertinent positives noted in HPI and otherwise all others were negative.     EXAM: /84 (BP Location: Left arm)   Pulse 77   Temp 98.2  F (36.8  C) (Oral)   Resp 16   Ht 1.702 m (5' 7\")   Wt (!) 155.4 kg (342 lb 9.6 oz)   SpO2 96%   BMI 53.66 kg/m     General Appearance: no acute distress, obese young woman laying in bed  HEENT: NC/AT  Neck: full passive ROM; no carotid bruits  Chest: CTAB; normal respiratory effort  Cardio: RRR; no murmurs; normal peripheral pulses  Abd: NT/ND  Mental status: Alert and oriented to person, place and time. Speech clear and language are within normal limits.  Cranial nerves: Pupils equal, round, and reactive to " light. Extraocular movements with notable dysconjugation of gaze with both sides lateral gazes with external eye not fully abducting. Visual fields full to confrontation. Facial strength normal and sensation intact bilaterally to LT , PP and temp. Palate elevation symmetric. Hearing intact. Tongue protrusion midline. Shoulder shrug symmetric.  Motor: Muscle tone and bulk are normal.  Strength within normal limits in all extremities.  Sensory: Light touch, pin prick sensation were normal.  Coordination: Finger-to-nose, heel-to-shin are performed normally.   Reflexes: Symmetric and within normal limits. Flexor plantar responses bilaterally.  Gait: Not tested    IMAGING: I have personally reviewed the patient's imaging:   MRI brain:  INTRACRANIAL CONTENTS: No acute or subacute infarct. No mass, acute hemorrhage, or extra-axial fluid collections. A couple foci of nonspecific T2/FLAIR hyperintensity within the white matter are of doubtful clinical significance and are within the range   of expected for a patient of this age. Signal intensity of the brain parenchyma is otherwise normal. Normal ventricles and sulci. Normal position of the cerebellar tonsils. No pathologic contrast enhancement.     SELLA: No abnormality accounting for technique.     OSSEOUS STRUCTURES/SOFT TISSUES:  Subtle diffuse marrow signal abnormality, most pronounced on DWI. The major intracranial vascular flow voids are maintained.      ORBITS: No abnormality accounting for technique.      SINUSES/MASTOIDS: No paranasal sinus mucosal disease. No middle ear or mastoid effusion.     IMPRESSION:   HEAD CT:  1.  No acute intracranial hemorrhage.     HEAD CTA:   1.  Normal CTA [place].     NECK CTA:  1.  Normal neck CTA.    LABS:   Hemoglobin A1C  <5.7 % 6.6 High              Total consult time 65 minutes with the time spent on chart review, imaging and lab results review, and more than 50 % of the time spent on coordination of cares and  face-to-face time with the patient including counseling regarding pathophysiology of the above conditions, results of the tests and further directions of care.     Jossy Sanz MD  [facility]

## 2024-08-12 NOTE — DISCHARGE SUMMARY
[facility]    Discharge Summary  Hospitalist    Date of Admission:  8/11/2024  Date of Discharge:  8/12/2024  Discharging Provider: Hector Valencia MD    Discharge Diagnoses        Strokelike symptoms  Chronic migraine without aura  Secondary polycythemia with history of DVT and stroke  Essential hypertension  Hypertensive urgency  Hyperlipidemia  Urge incontinence with bladder stimulator in place  Obstructive sleep apnea  Severe obesity  History of transient alertness of awareness and memory dysfunction which were felt to be nonepileptic  Prolonged QT  Inflammatory arthritis  Depression  Anxiety  ADHD    Hospital Course:    Johana Rodriguez is a 44 year old female with multiple medical problems who presents with strokelike symptoms.     Strokelike symptoms  -Presents with diplopia and tingling and numbness of right side of her face  -Initial CT head and CT angiogram of the head and neck is unremarkable  -MRI of the brain and orbits with and without contrast did not show acute stroke.  -Stroke neurology signed off and recommended general neurology consult.  Also evaluated by general neurology who cleared her for discharge and recommended outpatient follow-up with neuro-ophthalmology at [facility]  - She has a history of Strabismus surgery for Esotropia in 2023 and reportedly had problems with diplopia at that time also.  As mentioned above she will also follow-up with her ophthalmology team.     Chronic migraine without aura  -Patient endorses mild headache, she does not feel like she has a acute migraine attack at this time  -Unclear if above symptoms could be due to migraine variant although with mild headache doubt that to be the case.     Secondary polycythemia with history of DVT and stroke  -Follows up with hematology through [facility]  -Hemoglobin is within normal limits today at 14.8, no active issues at the moment  -Continue prior to admission Xarelto  -MRI of the brain was  reported as subtle diffuse marrow signal abnormality most pronounced on DWI which are new since 2018.  I have discussed with the patient to review this with her primary hematologist when she goes for follow-up in September.  She is aware of this.     Essential hypertension  Hypertensive urgency  Hyperlipidemia  -Initial blood pressure was quite elevated in the emergency room at 219/136  -Per stroke neurology BP goal is less than 180 systolic  -Continue prior to admission losartan and Crestor.  Blood pressure improved nicely on her PTA losartan showed no dosage adjustment at this time    Urge incontinence with bladder stimulator in place  -No active issues, continue prior to admission Ditropan     Obstructive sleep apnea  Severe obesity  -Resume CPAP/BiPAP per home setting     History of transient alertness of awareness and memory dysfunction which were felt to be nonepileptic  -Previously evaluated by neurology at [facility]     Prolonged QT  -Patient has a known history of prolonged QT, QTc today is 513, avoid QT prolonging medication     Inflammatory arthritis     Depression  Anxiety  ADHD  -Resume prior to admission regimen once verified by the pharmacy          Hector Valencia MD    Significant Results and Procedures   See below    Pending Results     Unresulted Labs Ordered in the Past 30 Days of this Admission       Date and Time Order Name Status Description    8/12/2024  1:17 PM Hemoglobin A1c In process     8/12/2024  1:17 PM Lipid panel reflex to direct LDL In process             Code Status   Full Code       Primary Care Physician   Sergei Nation    Physical Exam   Temp: 98.2  F (36.8  C) Temp src: Oral BP: 120/84 Pulse: 77   Resp: 16 SpO2: 96 % O2 Device: None (Room air)      Constitutional: AAOX3, NAD  Respiratory: CTA B/L, Normal WOB  Cardiovascular: RRR, No murmur  GI: Soft, Non- tender, BS- normoactive  Neuro: CN- grossly intact, Motor strength 5/5 on all 4 extremities.     Discharge Disposition    Discharged to home  Condition at discharge: Stable    Consultations This Hospital Stay   SPEECH LANGUAGE PATH ADULT IP CONSULT  SMOKING CESSATION PROGRAM IP CONSULT  NEUROLOGY IP CONSULT    Time Spent on this Encounter   I, Hector Valencia MD, personally saw the patient today and spent greater than 30 minutes discharging this patient.    Discharge Orders      Reason for your hospital stay    Diplopia, strokelike spells     Follow-up and recommended labs and tests     Follow up with primary care provider, Sergei Nation, within 7 days for hospital follow- up.    Primary ophthalmologist in 1 week     Activity    Your activity upon discharge: activity as tolerated     Diet    Follow this diet upon discharge: Orders Placed This Encounter      Regular Diet Adult     Discharge Medications   Current Discharge Medication List        CONTINUE these medications which have NOT CHANGED    Details   acetaminophen (TYLENOL) 325 MG tablet Take 325-650 mg by mouth every 6 hours as needed for mild pain      allopurinol (ZYLOPRIM) 100 MG tablet Take 100 mg by mouth daily      ARIPiprazole (ABILIFY) 5 MG tablet Take 5 mg by mouth daily      !! buPROPion (WELLBUTRIN XL) 150 MG 24 hr tablet Take 150 mg by mouth every morning With 300mg for total of 450mg      !! buPROPion (WELLBUTRIN XL) 300 MG 24 hr tablet Take 300 mg by mouth every morning With 150mg for total of 450mg      carboxymethylcellulose PF (REFRESH PLUS) 0.5 % ophthalmic solution Place 1 drop into both eyes 3 times daily as needed for dry eyes      clonazePAM (KLONOPIN) 1 MG tablet Take 1 mg by mouth 3 times daily as needed for anxiety      desvenlafaxine (PRISTIQ) 100 MG 24 hr tablet Take 100 mg by mouth daily      famotidine (PEPCID) 40 MG tablet Take 40 mg by mouth 2 times daily      Fluticasone-Umeclidin-Vilanterol (TRELEGY ELLIPTA) 200-62.5-25 MCG/ACT oral inhaler Inhale 1 puff into the lungs daily as needed      !! gabapentin (NEURONTIN) 300 MG capsule Take 300 mg  by mouth every morning      !! gabapentin (NEURONTIN) 300 MG capsule Take 900 mg by mouth at bedtime      lamoTRIgine (LAMICTAL) 100 MG tablet Take 100 mg by mouth 2 times daily      linaclotide (LINZESS) 145 MCG capsule Take 145 mcg by mouth every morning (before breakfast)      losartan (COZAAR) 50 MG tablet Take 50 mg by mouth 2 times daily      metoprolol tartrate (LOPRESSOR) 50 MG tablet Take 50 mg by mouth 2 times daily      oxyBUTYnin ER (DITROPAN XL) 10 MG 24 hr tablet Take 20 mg by mouth at bedtime      !! prazosin (MINIPRESS) 2 MG capsule Take 2 mg by mouth every morning      !! prazosin (MINIPRESS) 2 MG capsule Take 4 mg by mouth at bedtime      rivaroxaban ANTICOAGULANT (XARELTO) 20 MG TABS tablet Take 20 mg by mouth daily (with dinner)      rosuvastatin (CRESTOR) 20 MG tablet Take 20 mg by mouth every evening      tamsulosin (FLOMAX) 0.4 MG capsule Take 0.4 mg by mouth every evening      valACYclovir (VALTREX) 500 MG tablet Take 500 mg by mouth daily       !! - Potential duplicate medications found. Please discuss with provider.        Allergies   Allergies   Allergen Reactions    Ciprofloxacin Hives    Imitrex [Sumatriptan] Other (See Comments)     dystonia    Losartan Hives and Cough     8/11/24-pt has been getting this refilled and taking for many months     Data   Most Recent 3 CBC's:  Recent Labs   Lab Test 08/11/24  1254 07/17/18  1155   WBC 11.0 11.4*   HGB 14.8 15.4   MCV 92 91    195      Most Recent 3 BMP's:  Recent Labs   Lab Test 08/12/24  1112 08/12/24  0543 08/12/24  0046 08/11/24  1906 08/11/24  1254 07/17/18  1155   NA  --   --   --   --  138 139   POTASSIUM  --   --   --   --  4.1 3.6   CHLORIDE  --   --   --   --  100 105   CO2  --   --   --   --  30* 28   BUN  --   --   --   --  6.3 8   CR  --   --   --   --  0.47* 0.57   ANIONGAP  --   --   --   --  8 7   VIVIAN  --   --   --   --  9.7 8.8   * 170* 164*   < > 148* 88    < > = values in this interval not displayed.     Most  Recent 2 LFT's:  Recent Labs   Lab Test 07/17/18  1155   AST 8   ALT 21   ALKPHOS 92   BILITOTAL 0.5     Most Recent INR's and Anticoagulation Dosing History:  Anticoagulation Dose History          Latest Ref Rng & Units 7/17/2018 8/11/2024   Recent Dosing and Labs   INR 0.85 - 1.15 0.94  0.96       Details                 Most Recent 3 Troponin's:  Recent Labs   Lab Test 07/17/18  1646 07/17/18  1155   TROPI <0.015 <0.015     Most Recent Cholesterol Panel:No lab results found.  Most Recent 6 Bacteria Isolates From Any Culture (See EPIC Reports for Culture Details):No lab results found.  Most Recent TSH, T4 and A1c Labs:No lab results found.    Results for orders placed or performed during the hospital encounter of 08/11/24   Head CT w/o contrast    Narrative    EXAM: CT HEAD W/O CONTRAST  LOCATION: [facility]  DATE: 8/11/2024    INDICATION: on xarelto, headache, htn, diplopia  COMPARISON: None.  TECHNIQUE: Routine CT Head without IV contrast. Multiplanar reformats. Dose reduction techniques were used.    FINDINGS:  INTRACRANIAL CONTENTS: No intracranial hemorrhage, extraaxial collection, or mass effect.  No CT evidence of acute infarct. Normal parenchymal attenuation. Normal ventricles and sulci. Posterior fossa arachnoid cyst.    VISUALIZED ORBITS/SINUSES/MASTOIDS: No intraorbital abnormality. No paranasal sinus mucosal disease. No middle ear or mastoid effusion.    BONES/SOFT TISSUES: No acute abnormality.      Impression    IMPRESSION:  1.  No acute intracranial hemorrhage.   CTA Head Neck with Contrast    Narrative    EXAM: CTA HEAD NECK W CONTRAST  LOCATION: [facility]  DATE: 8/11/2024    INDICATION: on xarelto, headache, htn, diplopia  COMPARISON: None.  CONTRAST: 67 mL Isovue 370  TECHNIQUE: Head and neck CT angiogram with IV contrast. Axial helical CT images of the head and neck vessels obtained during the arterial phase of intravenous contrast  administration. Axial 2D reconstructed images and multiplanar 3D MIP reconstructed   images of the head and neck vessels were performed by the technologist. Dose reduction techniques were used. All stenosis measurements made according to NASCET criteria unless otherwise specified.    FINDINGS:     HEAD CTA:  ANTERIOR CIRCULATION: No stenosis/occlusion, aneurysm, or high flow vascular malformation. Standard [place] anatomy.    POSTERIOR CIRCULATION: No stenosis/occlusion, aneurysm, or high flow vascular malformation. Balanced vertebral arteries supply a normal basilar artery.     DURAL VENOUS SINUSES: Expected enhancement of the major dural venous sinuses.    NECK CTA:  RIGHT CAROTID: No measurable stenosis or dissection.    LEFT CAROTID: No measurable stenosis or dissection.    VERTEBRAL ARTERIES: No focal stenosis or dissection. Balanced vertebral arteries.    AORTIC ARCH: Two-vessel aortic arch. NONVASCULAR STRUCTURES: Unremarkable.      Impression    IMPRESSION:   HEAD CT:  1.  No acute intracranial hemorrhage.    HEAD CTA:   1.  Normal CTA [place].    NECK CTA:  1.  Normal neck CTA.   MR Brain w/o & w Contrast    Narrative    EXAM: MR BRAIN W/O and W CONTRAST  LOCATION: [facility]  DATE: 8/11/2024    INDICATION: Double vision  COMPARISON: Same day CTA, brain MRI 7/17/2018  CONTRAST: 10 mL Gadavist  TECHNIQUE: Routine multiplanar multisequence head MRI without and with intravenous contrast.    FINDINGS:  INTRACRANIAL CONTENTS: No acute or subacute infarct. No mass, acute hemorrhage, or extra-axial fluid collections. A couple foci of nonspecific T2/FLAIR hyperintensity within the white matter are of doubtful clinical significance and are within the range   of expected for a patient of this age. Signal intensity of the brain parenchyma is otherwise normal. Normal ventricles and sulci. Normal position of the cerebellar tonsils. No pathologic contrast enhancement.    SELLA:  No abnormality accounting for technique.    OSSEOUS STRUCTURES/SOFT TISSUES:  Subtle diffuse marrow signal abnormality, most pronounced on DWI. The major intracranial vascular flow voids are maintained.     ORBITS: No abnormality accounting for technique.     SINUSES/MASTOIDS: No paranasal sinus mucosal disease. No middle ear or mastoid effusion.       Impression    IMPRESSION:  1.  No acute intracranial abnormality.  2.  Subtle diffuse marrow signal abnormality most pronounced on DWI, new since 2018. Correlate with labs for underlying hematopoietic versus metabolic derangement.   Echocardiogram Complete     Value    LVEF  60-65%    Narrative    [id number]  [id number]  [id number]^SANNA^SUZAN     [facility]  Echocardiography Laboratory  [address] [place] [id number]     Name: SUNNY CASTILLO  MRN: [id number]  : 1980  Study Date: 2024 08:21 AM  Age: 44 yrs  Gender: Female  Patient Location: [facility]  Reason For Study: TIA  Ordering Physician: SUZAN ISIDRO  Performed By: Shleley Deng     BSA: 2.5 m2  Height: 67 in  Weight: 342 lb  HR: 76  BP: 185/114 mmHg  ______________________________________________________________________________  Procedure  Complete Portable Echo Adult. Optison (NDC #[id number]) given intravenously.  ______________________________________________________________________________  Interpretation Summary     1. Normal biventricular size and function. Left ventricular ejection fraction  of 60-65%.  2. No segmental wall motion abnormalities noted.  3. No hemodynamically significant valvular disease.  4. Ascending aorta dilatation is present. 3.8 cms.  No prior study for comparison. Technically adequate study.  ______________________________________________________________________________  Left Ventricle  The left ventricle is normal in size. There is mild concentric left  ventricular hypertrophy. Left ventricular systolic function is normal.  The  visual ejection fraction is 60-65%. Diastolic Doppler findings (E/E' ratio  and/or other parameters) suggest left ventricular filling pressures are  normal. No regional wall motion abnormalities noted.     Right Ventricle  The right ventricle is normal in size and function.     Atria  Normal left atrial size. Right atrial size is normal.     Mitral Valve  The mitral valve leaflets appear normal. There is no evidence of stenosis,  fluttering, or prolapse. There is trace mitral regurgitation.     Tricuspid Valve  Normal tricuspid valve. There is trace tricuspid regurgitation.     Aortic Valve  The aortic valve is not well visualized. There is trace aortic regurgitation.     Pulmonic Valve  The pulmonic valve is not well visualized.     Vessels  Normal size aorta. Ascending aorta dilatation is present. 3.8 cms. IVC  diameter <2.1 cm collapsing >50% with sniff suggests a normal RA pressure of 3  mmHg.     Pericardium  There is no pericardial effusion.     Rhythm  Sinus rhythm was noted.  ______________________________________________________________________________  MMode/2D Measurements & Calculations     IVSd: 1.3 cm  LVIDd: 4.5 cm  LVIDs: 3.1 cm  LVPWd: 2.1 cm  FS: 30.3 %  LV mass(C)d: 335.0 grams  LV mass(C)dI: 131.9 grams/m2  Ao root diam: 3.3 cm  LA dimension: 3.5 cm  asc Aorta Diam: 3.8 cm  LA/Ao: 1.1  LVOT diam: 2.5 cm  LVOT area: 4.7 cm2  Ao root diam index Ht(cm/m): 1.9  Ao root diam index BSA (cm/m2): 1.3  Asc Ao diam index BSA (cm/m2): 1.5  Asc Ao diam index Ht(cm/m): 2.3  RV Base: 3.3 cm  RWT: 0.93  TAPSE: 3.0 cm     Doppler Measurements & Calculations  MV E max carlo: 85.7 cm/sec  MV A max carlo: 109.5 cm/sec  MV E/A: 0.78  MV dec time: 0.22 sec  E/E' av.6  Lateral E/e': 12.3  Medial E/e': 10.9  RV S Carlo: 13.5 cm/sec     ______________________________________________________________________________  Report approved by: Estuardo Patel 2024 10:25 AM

## 2024-08-12 NOTE — PROGRESS NOTES
Top portion must ALWAYS be completed  PRIMARY Concern: Double vision  SAFETY RISK Concerns (fall risk, behaviors, etc.): Green.      Tests/Procedures for NEXT shift: AM labs.   Consults? (Pending/following, signed-off?) Neurology.  Where is patient from? (Home, TCU, etc.): Home.  Other Important info for NEXT shift: Neuro checks due, admission done, skin check pending.   Anticipated DC date & active delays: TBD.  _____________________________________________________________________________  SUMMARY NOTE:    Up to unit at 1700, blood pressure elevated, highest reading was 192/130. Patient was given PRN hydralazine, BP down to 175/123.   Orientation/Cognitive: A/O X4.  Observation Goals (Met/ Not Met): Not met.   Mobility Level/Assist Equipment: SBA.   Antibiotics & Plan (IV/po, length of tx left): None.   Pain Management: Tylenol given for headache.   Tele/VS/O2: Tele applied.   ABNL Lab/BG:   Diet: NPO.  Bowel/Bladder: Continent.   Skin Concerns: Not checked.   Drains/Devices: PIV SL.   Patient Stated Goal for Today: Rest, complete work up.

## 2024-08-12 NOTE — PLAN OF CARE
Goal Outcome Evaluation:      Plan of Care Reviewed With: patient    Overall Patient Progress: improving    Observation goals  PRIOR TO DISCHARGE        Comments: List all goals to be met before discharge home        -Free from ACUTE neuro deficits-Met    -Testing complete-Not met, Echo pending    -Other:        PRIMARY Concern: Here with c/o of double vision and right sided tingling and numbness on face  SAFETY RISK Concerns (fall risk, behaviors, etc.): fall risk  Isolation/Type: n/a  Tests/Procedures for NEXT shift: Echo  Consults? (Pending/following, signed-off?) speech consult pending  Where is patient from? (Home, TCU, etc.): home in [place]  Other Important info for NEXT shift: Pt wants to eat, still NPO till speech sees her  Anticipated DC date & active delays: TBD  _____________________________________________________________________________  SUMMARY NOTE:  Orientation/Cognitive: A&OX4  Observation Goals (Met/ Not Met): Not met, Echo pending  Mobility Level/Assist Equipment:SBA/IND  Antibiotics & Plan (IV/po, length of tx left): N/A  Pain Management:PRN Tyl avail   Tele/VS/O2: Tele-SR, VSS on RA  ABNL Lab/BG:See chart  Diet: NPO  Bowel/Bladder: Continent  Skin Concerns: none  Drains/Devices: PIV S/l  Patient Stated Goal for Today:  sleep and eat

## 2024-08-12 NOTE — PROGRESS NOTES
"Pt refused hospital unit BiPAP.    BP (!) 174/103 (BP Location: Left arm)   Pulse 84   Temp 98.3  F (36.8  C) (Oral)   Resp 18   Ht 1.702 m (5' 7\")   Wt (!) 154 kg (339 lb 8.1 oz)   SpO2 98%   BMI 53.17 kg/m      Tobin Apacible, RT    " large sialolith located within dilated and inflamed submandibular duct

## 2024-08-14 ENCOUNTER — PATIENT OUTREACH (OUTPATIENT)
Dept: CARE COORDINATION | Facility: CLINIC | Age: 44
End: 2024-08-14
Payer: MEDICARE

## 2024-08-14 NOTE — PROGRESS NOTES
[facility]:   [facility] Contact  [facility]/Voicemail     Clinical Data: Post-Discharge Outreach     Outreach attempted x 2.  Left message on patient's voicemail, providing [facility]'s central phone number of [phone] ([phone]) for questions/concerns and/or to schedule an appt with an [facility] provider, if they do not have a PCP.      Plan:  [facility] will do no further outreaches at this time.       Melina Barone  Community Health Worker  [facility], [facility]  [phone]      *Connected Care Resource Team does NOT follow patient ongoing. Referrals are identified based on internal discharge reports and the outreach is to ensure patient has an understanding of their discharge instructions.